# Patient Record
Sex: FEMALE | Race: WHITE | Employment: FULL TIME | URBAN - METROPOLITAN AREA
[De-identification: names, ages, dates, MRNs, and addresses within clinical notes are randomized per-mention and may not be internally consistent; named-entity substitution may affect disease eponyms.]

---

## 2017-09-07 ENCOUNTER — OFFICE VISIT (OUTPATIENT)
Dept: FAMILY MEDICINE CLINIC | Age: 47
End: 2017-09-07

## 2017-09-07 VITALS
SYSTOLIC BLOOD PRESSURE: 117 MMHG | DIASTOLIC BLOOD PRESSURE: 68 MMHG | WEIGHT: 163.4 LBS | HEART RATE: 79 BPM | TEMPERATURE: 97.6 F | OXYGEN SATURATION: 97 %

## 2017-09-07 DIAGNOSIS — Z00.00 ROUTINE GENERAL MEDICAL EXAMINATION AT A HEALTH CARE FACILITY: ICD-10-CM

## 2017-09-07 DIAGNOSIS — J30.2 SEASONAL ALLERGIC RHINITIS, UNSPECIFIED ALLERGIC RHINITIS TRIGGER: ICD-10-CM

## 2017-09-07 DIAGNOSIS — F41.8 DEPRESSION WITH ANXIETY: ICD-10-CM

## 2017-09-07 DIAGNOSIS — Z01.419 NORMAL GYNECOLOGIC EXAMINATION: Primary | ICD-10-CM

## 2017-09-07 DIAGNOSIS — N20.0 KIDNEY STONES: ICD-10-CM

## 2017-09-07 PROCEDURE — 99386 PREV VISIT NEW AGE 40-64: CPT | Performed by: FAMILY MEDICINE

## 2017-09-07 RX ORDER — BUPROPION HYDROCHLORIDE 150 MG/1
150 TABLET ORAL EVERY MORNING
Qty: 30 TABLET | Refills: 1 | Status: SHIPPED | OUTPATIENT
Start: 2017-09-07 | End: 2018-01-18 | Stop reason: CLARIF

## 2017-09-09 LAB
A/G RATIO: 1.3 (ref 1.1–2.2)
ALBUMIN SERPL-MCNC: 4 G/DL (ref 3.4–5)
ALP BLD-CCNC: 60 U/L (ref 40–129)
ALT SERPL-CCNC: 12 U/L (ref 10–40)
ANION GAP SERPL CALCULATED.3IONS-SCNC: 12 MMOL/L (ref 3–16)
AST SERPL-CCNC: 15 U/L (ref 15–37)
BILIRUB SERPL-MCNC: 0.4 MG/DL (ref 0–1)
BUN BLDV-MCNC: 19 MG/DL (ref 7–20)
CALCIUM SERPL-MCNC: 9.3 MG/DL (ref 8.3–10.6)
CHLORIDE BLD-SCNC: 103 MMOL/L (ref 99–110)
CHOLESTEROL, TOTAL: 179 MG/DL (ref 0–199)
CO2: 25 MMOL/L (ref 21–32)
CREAT SERPL-MCNC: 0.7 MG/DL (ref 0.6–1.1)
GFR AFRICAN AMERICAN: >60
GFR NON-AFRICAN AMERICAN: >60
GLOBULIN: 3.2 G/DL
GLUCOSE BLD-MCNC: 94 MG/DL (ref 70–99)
HDLC SERPL-MCNC: 84 MG/DL (ref 40–60)
LDL CHOLESTEROL CALCULATED: 84 MG/DL
POTASSIUM SERPL-SCNC: 4.8 MMOL/L (ref 3.5–5.1)
SODIUM BLD-SCNC: 140 MMOL/L (ref 136–145)
TOTAL PROTEIN: 7.2 G/DL (ref 6.4–8.2)
TRIGL SERPL-MCNC: 57 MG/DL (ref 0–150)
TSH SERPL DL<=0.05 MIU/L-ACNC: 1.2 UIU/ML (ref 0.27–4.2)
VLDLC SERPL CALC-MCNC: 11 MG/DL

## 2017-09-11 LAB
HPV COMMENT: NORMAL
HPV TYPE 16: NOT DETECTED
HPV TYPE 18: NOT DETECTED
HPVOH (OTHER TYPES): NOT DETECTED

## 2017-10-18 ENCOUNTER — OFFICE VISIT (OUTPATIENT)
Dept: FAMILY MEDICINE CLINIC | Age: 47
End: 2017-10-18

## 2017-10-18 VITALS
RESPIRATION RATE: 12 BRPM | OXYGEN SATURATION: 99 % | HEART RATE: 76 BPM | SYSTOLIC BLOOD PRESSURE: 112 MMHG | DIASTOLIC BLOOD PRESSURE: 76 MMHG | WEIGHT: 162 LBS | TEMPERATURE: 97.3 F

## 2017-10-18 DIAGNOSIS — Z23 NEED FOR INFLUENZA VACCINATION: ICD-10-CM

## 2017-10-18 DIAGNOSIS — F41.9 ANXIETY: Primary | ICD-10-CM

## 2017-10-18 DIAGNOSIS — R51.9 NONINTRACTABLE HEADACHE, UNSPECIFIED CHRONICITY PATTERN, UNSPECIFIED HEADACHE TYPE: ICD-10-CM

## 2017-10-18 PROCEDURE — 90471 IMMUNIZATION ADMIN: CPT | Performed by: FAMILY MEDICINE

## 2017-10-18 PROCEDURE — 90686 IIV4 VACC NO PRSV 0.5 ML IM: CPT | Performed by: FAMILY MEDICINE

## 2017-10-18 PROCEDURE — 99214 OFFICE O/P EST MOD 30 MIN: CPT | Performed by: FAMILY MEDICINE

## 2017-10-18 RX ORDER — M-VIT,TX,IRON,MINS/CALC/FOLIC 27MG-0.4MG
1 TABLET ORAL DAILY
COMMUNITY

## 2017-10-18 ASSESSMENT — PATIENT HEALTH QUESTIONNAIRE - PHQ9
SUM OF ALL RESPONSES TO PHQ QUESTIONS 1-9: 0
2. FEELING DOWN, DEPRESSED OR HOPELESS: 0
1. LITTLE INTEREST OR PLEASURE IN DOING THINGS: 0
SUM OF ALL RESPONSES TO PHQ9 QUESTIONS 1 & 2: 0

## 2017-10-18 NOTE — PROGRESS NOTES
Vaccine Information Sheet, \"Influenza - Inactivated\"  given to Harvey Jacobs, or parent/legal guardian of  Harvey Jacobs and verbalized understanding. Patient responses:    Have you ever had a reaction to a flu vaccine? No  Are you able to eat eggs without adverse effects? Yes  Do you have any current illness? No  Have you ever had [place]illian [place] Syndrome? No    Flu vaccine given per order. Please see immunization tab.

## 2017-10-18 NOTE — PROGRESS NOTES
Patient is here for follow up of anxiety. She was having occasional headaches . She had 2.5 drinks and developed headache and emesis. She took it for 2.5 weeks prior to running into the problem. She drinks alcohol 1-2 drinks 1-2 nights/ week. She has decreased caffeine to 1/ day - 1 coffee. She has switched to herbal tea - decaf. She is doing well with change to Zoloft 50 mg qd X 2 weeks. Slightly less energy ,but manageable. Sleeping relatively okay. Her anxiety is better overall. Less irritable. She had some increased energy with the Wellbutrin and it helped with anxiety. No sexual side effects with Zoloft . No change in libido with either Wellbutrin or Zoloft. No h/o seizures. Mood is at about 8-9/10 now and was 6/10 prior. Her 15 yo daughter was on Zoloft. No suicidal or homicidal ideation. ROS: All other systems were reviewed and are negative . Patient's allergies and medications were reviewed. Patient's past medical, surgical, social , and family history were reviewed. OBJECTIVE:  /76   Pulse 76   Temp 97.3727599064277 °F (36.3 °C) (Oral)   Resp 12   Wt 162 lb (73.5 kg)   LMP  (LMP Unknown)   SpO2 99%   Breastfeeding? No   General: NAD, cooperative, alert and oriented X 3. Mood / affect is good. good insight. well hydrated. Neck : no lymphadenopathy, supple, FROM  CV: Regular rate and rhythm , no murmurs/ rub/ gallop. No edema. Lungs : CTA bilaterally, breathing comfortably  Abdomen: positive bowel sounds, soft , non tender, non distended. No hepatosplenomegaly. No CVA tenderness. Skin: no rashes. Non tender. ASSESSMENT/  PLAN:  Sol was seen today for anxiety. Diagnoses and all orders for this visit:    Anxiety  -     Improved and continue Sertraline (ZOLOFT) 50 MG tablet; Take 1 tablet by mouth daily  Nonintractable headache, unspecified chronicity pattern, unspecified headache type        -     Stable off Wellbutrin. Monitor.    Need for influenza vaccination  -     INFLUENZA, QUADV, 3 YRS AND OLDER, IM, PF, PREFILL SYR OR SDV, 0.5ML (FLUZONE QUADV, PF)    Follow up 3-4  months/ prn.

## 2018-01-18 ENCOUNTER — OFFICE VISIT (OUTPATIENT)
Dept: FAMILY MEDICINE CLINIC | Age: 48
End: 2018-01-18

## 2018-01-18 VITALS
WEIGHT: 161.8 LBS | HEART RATE: 77 BPM | SYSTOLIC BLOOD PRESSURE: 122 MMHG | TEMPERATURE: 97.2 F | RESPIRATION RATE: 14 BRPM | BODY MASS INDEX: 22.65 KG/M2 | HEIGHT: 71 IN | DIASTOLIC BLOOD PRESSURE: 71 MMHG

## 2018-01-18 DIAGNOSIS — Z23 NEED FOR VACCINE FOR DT (DIPHTHERIA-TETANUS): ICD-10-CM

## 2018-01-18 DIAGNOSIS — F41.9 ANXIETY: Primary | ICD-10-CM

## 2018-01-18 PROCEDURE — 90714 TD VACC NO PRESV 7 YRS+ IM: CPT | Performed by: FAMILY MEDICINE

## 2018-01-18 PROCEDURE — 99214 OFFICE O/P EST MOD 30 MIN: CPT | Performed by: FAMILY MEDICINE

## 2018-01-18 PROCEDURE — 90471 IMMUNIZATION ADMIN: CPT | Performed by: FAMILY MEDICINE

## 2018-06-15 DIAGNOSIS — F41.9 ANXIETY: ICD-10-CM

## 2019-08-05 ENCOUNTER — OFFICE VISIT (OUTPATIENT)
Dept: FAMILY MEDICINE CLINIC | Age: 49
End: 2019-08-05
Payer: COMMERCIAL

## 2019-08-05 VITALS
OXYGEN SATURATION: 98 % | BODY MASS INDEX: 23.88 KG/M2 | RESPIRATION RATE: 18 BRPM | HEART RATE: 73 BPM | WEIGHT: 168.8 LBS | DIASTOLIC BLOOD PRESSURE: 82 MMHG | SYSTOLIC BLOOD PRESSURE: 122 MMHG | TEMPERATURE: 97.9 F

## 2019-08-05 DIAGNOSIS — R23.2 HOT FLASHES: ICD-10-CM

## 2019-08-05 DIAGNOSIS — F41.9 ANXIETY: ICD-10-CM

## 2019-08-05 DIAGNOSIS — M21.619 BUNION OF GREAT TOE: ICD-10-CM

## 2019-08-05 DIAGNOSIS — Z12.11 COLON CANCER SCREENING: ICD-10-CM

## 2019-08-05 DIAGNOSIS — Z00.00 ROUTINE GENERAL MEDICAL EXAMINATION AT A HEALTH CARE FACILITY: Primary | ICD-10-CM

## 2019-08-05 DIAGNOSIS — G47.9 SLEEPING DIFFICULTIES: ICD-10-CM

## 2019-08-05 PROCEDURE — 99396 PREV VISIT EST AGE 40-64: CPT | Performed by: FAMILY MEDICINE

## 2019-08-05 NOTE — PROGRESS NOTES
flashes  - TSH without Reflex; Future  - likely menopause contributing. 4. Sleeping difficulties  - Sleep hygiene recommended. 5. Bunion of great toe  - good supportive footwear recommended. 6. Colon cancer screening  - Gastroenterology referral - Wild Latham MD, Gastroenterology, [facility]    PLAN:   Follow a low fat, low cholesterol diet,  continue current healthy lifestyle patterns, including regular cardiovascular exercise >150 minutes per week,  and return for routine annual checkups  Colonoscopy screening recommended . Yearly mammogram recommended , as well as monthly self breast exam.   Calcium 1500 mg/ day , Vitamin D 800 IU/ day, and weight bearing exercise. Follow up yearly or as needed.

## 2019-10-22 ENCOUNTER — TELEPHONE (OUTPATIENT)
Dept: FAMILY MEDICINE CLINIC | Age: 49
End: 2019-10-22

## 2019-10-22 DIAGNOSIS — Z23 NEED FOR IMMUNIZATION AGAINST TYPHOID: Primary | ICD-10-CM

## 2019-10-31 ENCOUNTER — TELEPHONE (OUTPATIENT)
Dept: FAMILY MEDICINE CLINIC | Age: 49
End: 2019-10-31

## 2019-11-01 DIAGNOSIS — R23.2 HOT FLASHES: ICD-10-CM

## 2019-11-01 DIAGNOSIS — Z00.00 ROUTINE GENERAL MEDICAL EXAMINATION AT A HEALTH CARE FACILITY: ICD-10-CM

## 2019-11-01 LAB
A/G RATIO: 2.2 (ref 1.1–2.2)
ALBUMIN SERPL-MCNC: 4.8 G/DL (ref 3.4–5)
ALP BLD-CCNC: 69 U/L (ref 40–129)
ALT SERPL-CCNC: 10 U/L (ref 10–40)
ANION GAP SERPL CALCULATED.3IONS-SCNC: 13 MMOL/L (ref 3–16)
AST SERPL-CCNC: 15 U/L (ref 15–37)
BILIRUB SERPL-MCNC: 0.4 MG/DL (ref 0–1)
BUN BLDV-MCNC: 20 MG/DL (ref 7–20)
CALCIUM SERPL-MCNC: 9.3 MG/DL (ref 8.3–10.6)
CHLORIDE BLD-SCNC: 102 MMOL/L (ref 99–110)
CHOLESTEROL, TOTAL: 185 MG/DL (ref 0–199)
CO2: 25 MMOL/L (ref 21–32)
CREAT SERPL-MCNC: 0.6 MG/DL (ref 0.6–1.1)
GFR AFRICAN AMERICAN: >60
GFR NON-AFRICAN AMERICAN: >60
GLOBULIN: 2.2 G/DL
GLUCOSE BLD-MCNC: 93 MG/DL (ref 70–99)
HCT VFR BLD CALC: 41.4 % (ref 36–48)
HDLC SERPL-MCNC: 74 MG/DL (ref 40–60)
HEMOGLOBIN: 14.1 G/DL (ref 12–16)
LDL CHOLESTEROL CALCULATED: 99 MG/DL
MCH RBC QN AUTO: 33.2 PG (ref 26–34)
MCHC RBC AUTO-ENTMCNC: 34.1 G/DL (ref 31–36)
MCV RBC AUTO: 97.2 FL (ref 80–100)
PDW BLD-RTO: 12.5 % (ref 12.4–15.4)
PLATELET # BLD: 266 K/UL (ref 135–450)
PMV BLD AUTO: 9.1 FL (ref 5–10.5)
POTASSIUM SERPL-SCNC: 5.1 MMOL/L (ref 3.5–5.1)
RBC # BLD: 4.26 M/UL (ref 4–5.2)
SODIUM BLD-SCNC: 140 MMOL/L (ref 136–145)
TOTAL PROTEIN: 7 G/DL (ref 6.4–8.2)
TRIGL SERPL-MCNC: 58 MG/DL (ref 0–150)
TSH SERPL DL<=0.05 MIU/L-ACNC: 1.23 UIU/ML (ref 0.27–4.2)
VLDLC SERPL CALC-MCNC: 12 MG/DL
WBC # BLD: 4 K/UL (ref 4–11)

## 2019-11-13 ENCOUNTER — TELEPHONE (OUTPATIENT)
Dept: FAMILY MEDICINE CLINIC | Age: 49
End: 2019-11-13

## 2019-11-21 DIAGNOSIS — R92.8 ABNORMAL MAMMOGRAM OF LEFT BREAST: Primary | ICD-10-CM

## 2021-08-26 ENCOUNTER — OFFICE VISIT (OUTPATIENT)
Dept: FAMILY MEDICINE CLINIC | Age: 51
End: 2021-08-26
Payer: COMMERCIAL

## 2021-08-26 VITALS
WEIGHT: 163.2 LBS | BODY MASS INDEX: 23.37 KG/M2 | HEIGHT: 70 IN | HEART RATE: 85 BPM | RESPIRATION RATE: 16 BRPM | TEMPERATURE: 96.8 F | SYSTOLIC BLOOD PRESSURE: 112 MMHG | OXYGEN SATURATION: 98 % | DIASTOLIC BLOOD PRESSURE: 74 MMHG

## 2021-08-26 DIAGNOSIS — M76.32 ILIOTIBIAL BAND SYNDROME, LEFT LEG: ICD-10-CM

## 2021-08-26 DIAGNOSIS — G43.909 MIGRAINE WITHOUT STATUS MIGRAINOSUS, NOT INTRACTABLE, UNSPECIFIED MIGRAINE TYPE: ICD-10-CM

## 2021-08-26 DIAGNOSIS — Z23 NEED FOR SHINGLES VACCINE: ICD-10-CM

## 2021-08-26 DIAGNOSIS — M25.571 RIGHT ANKLE PAIN, UNSPECIFIED CHRONICITY: ICD-10-CM

## 2021-08-26 DIAGNOSIS — M70.62 TROCHANTERIC BURSITIS OF LEFT HIP: ICD-10-CM

## 2021-08-26 DIAGNOSIS — J30.2 SEASONAL ALLERGIC RHINITIS, UNSPECIFIED TRIGGER: ICD-10-CM

## 2021-08-26 DIAGNOSIS — Z23 NEEDS FLU SHOT: ICD-10-CM

## 2021-08-26 DIAGNOSIS — Z00.00 ROUTINE GENERAL MEDICAL EXAMINATION AT A HEALTH CARE FACILITY: Primary | ICD-10-CM

## 2021-08-26 DIAGNOSIS — Z01.419 GYNECOLOGIC EXAM NORMAL: ICD-10-CM

## 2021-08-26 PROCEDURE — 90686 IIV4 VACC NO PRSV 0.5 ML IM: CPT | Performed by: FAMILY MEDICINE

## 2021-08-26 PROCEDURE — 90472 IMMUNIZATION ADMIN EACH ADD: CPT | Performed by: FAMILY MEDICINE

## 2021-08-26 PROCEDURE — 99213 OFFICE O/P EST LOW 20 MIN: CPT | Performed by: FAMILY MEDICINE

## 2021-08-26 PROCEDURE — 90471 IMMUNIZATION ADMIN: CPT | Performed by: FAMILY MEDICINE

## 2021-08-26 PROCEDURE — 99396 PREV VISIT EST AGE 40-64: CPT | Performed by: FAMILY MEDICINE

## 2021-08-26 PROCEDURE — 90750 HZV VACC RECOMBINANT IM: CPT | Performed by: FAMILY MEDICINE

## 2021-08-26 RX ORDER — SUMATRIPTAN 100 MG/1
100 TABLET, FILM COATED ORAL
Qty: 9 TABLET | Refills: 1 | Status: SHIPPED | OUTPATIENT
Start: 2021-08-26 | End: 2021-08-26

## 2021-08-26 SDOH — ECONOMIC STABILITY: FOOD INSECURITY: WITHIN THE PAST 12 MONTHS, THE FOOD YOU BOUGHT JUST DIDN'T LAST AND YOU DIDN'T HAVE MONEY TO GET MORE.: NEVER TRUE

## 2021-08-26 SDOH — ECONOMIC STABILITY: FOOD INSECURITY: WITHIN THE PAST 12 MONTHS, YOU WORRIED THAT YOUR FOOD WOULD RUN OUT BEFORE YOU GOT MONEY TO BUY MORE.: NEVER TRUE

## 2021-08-26 ASSESSMENT — PATIENT HEALTH QUESTIONNAIRE - PHQ9
2. FEELING DOWN, DEPRESSED OR HOPELESS: 0
SUM OF ALL RESPONSES TO PHQ QUESTIONS 1-9: 0
SUM OF ALL RESPONSES TO PHQ9 QUESTIONS 1 & 2: 0
1. LITTLE INTEREST OR PLEASURE IN DOING THINGS: 0

## 2021-08-26 ASSESSMENT — SOCIAL DETERMINANTS OF HEALTH (SDOH): HOW HARD IS IT FOR YOU TO PAY FOR THE VERY BASICS LIKE FOOD, HOUSING, MEDICAL CARE, AND HEATING?: NOT HARD AT ALL

## 2021-08-26 NOTE — PROGRESS NOTES
Patient is a 44y.o. year old female presenting for a complete physical today. Last PAP:9/17-nl/ HPV neg; 2013 ? History of Abnormal PAP: ASCUS only ? - 2005 or earlier? Prior mammogram:2/21; 11/19; 11/18; 1/17   Last colonoscopy: 11/19- Dr. Lang Mcleod - repeat in 11/24   Last DEXA: never  Last eye exam: scheduled 8/31/21  Current smoker: no   Self breast exam: yes   Exercise: 4d/ week - jazzercise/ aerobics  Caffeine: 1-2 d/ week  Alcohol: 4/ week   LMP: 1/19. She gets headache 0-4/ month. No photophobia. Excedrin migraine usually helps. No vomiting, diarrhea . No visual aura. Rarely has nausea. Left sided usually. Unsure what triggers her headache. She has had 2 headaches in the past 2 weeks. Last headache was 8/22/21 and 8/25/21. Slight headache today. She has menopause symptoms of hot flashes, decreased libido. She has some intermittent right ankle pain - sharp at times. 1 q 2-3 weeks. Wears shoes for [employer]. Ankle brace helped. Started 6 months ago. Left hip pain off and on . Lying on left side flares it. Started X 3-6 months. Occurs 4 times per week, mostly with pressure at [place] 4 d/ week. Left leg cramps - lateral leg - 1x/ week. Some snoring - positional only currently. Bunions to great toes. No pain . Patient's allergies and medications were reviewed. Patient's past medical, surgical, social , and family history were reviewed.      Past Medical History:   Diagnosis Date    Allergic rhinitis     Kidney stones     Kidney stones     Visual impairment     wear contacts        Review of patient's past surgical history indicates:     Past Surgical History:   Procedure Laterality Date    APPENDECTOMY  34    INTRAUTERINE DEVICE INSERTION  2009    removed in 2013                                                   Current Outpatient Medications   Medication Sig Dispense Refill    NONFORMULARY AMBEREN      Multiple Vitamins-Minerals (THERAPEUTIC MULTIVITAMIN-MINERALS) tablet Take 1 tablet by mouth daily      Fluticasone Propionate (FLONASE NA) by Nasal route       No current facility-administered medications for this visit. Allergies   Allergen Reactions    Seasonal Other (See Comments)     Runny nose       Social History     Tobacco Use    Smoking status: Never Smoker    Smokeless tobacco: Never Used   Substance Use Topics    Alcohol use: Yes    Drug use: Not on file        Family History   Problem Relation Age of Onset    Diabetes Mother     Heart Disease Father     Heart Attack Father 79        ROS:  Feeling well. No dyspnea or chest pain on exertion. No abdominal pain, change in bowel habits, black or bloody stools. No urinary tract symptoms. No neurological complaints. See patient physical/  ROS questionnaire. OBJECTIVE:   /74   Pulse 85   Temp 96.8 °F (36 °C)   Resp 16   Ht 5' 10\" (1.778 m)   Wt 163 lb 3.2 oz (74 kg)   LMP  (LMP Unknown)   SpO2 98%   BMI 23.42 kg/m²   There were no vitals filed for this visit. The patient appears well, alert, oriented x 3, in no distress. HEENT : normocephalic, atraumatic, PERRLA, EOMI, tympanic membranes and nasopharynx are normal.  Neck supple. No adenopathy or thyromegaly. Upper extremities : DTRs 2+ biceps/ triceps/ brachioradialis bilateral.  FROM. Strength 5/5. Lungs are clear, good air entry, no wheezes, rhonchi or rales. Breathing comfortably. Cardiovascular: regular rate and rhythm. S1 and S2 are normal, no murmurs,rubs, and gallops. No edema. Abdomen is soft without tenderness, guarding, mass or organomegaly. Normal bowel sounds. Back: non tender. FROM. negative straight leg-raise. Breasts are symmetric. No dominant, discrete, fixed  or suspicious masses are noted. No skin or nipple changes or axillary nodes. : Vagina and vulva are normal;  no discharge is noted. Cervix normal without lesions.  Uterus anteverted and mobile, normal in size and shape without tenderness. Adnexa normal in size without masses or tenderness. Pap Smear - is completed today. Lower extremities : DTRs 2+ knees and ankles bilateral.  FROM. Strength 5/5. Negative straight leg-raise. No edema or erythema bilateral.  normal peripheral pulses. Right ankle: minimal tenderness to anterior/lateral ankle. No edema or erythema. Left LUE: tenderness to lateral hip - inferior to greater trochanter and ITB. No edema or erythema. Strength 5/5. Neuro: Cranial nerves 2-12 are normal. Deep tendon reflexes are 2+ and equal to all extremities. No focal sensory, or motor deficit noted. Skin: no rashes or suspicious lesions. ASSESSMENT:   1. Routine general medical examination at a health care facility  - Patient had Biometrics at work. 2. Gynecologic exam normal  - PAP SMEAR  - Human papillomavirus (HPV) DNA probe thin prep high risk    3. Seasonal allergic rhinitis, unspecified trigger  - Stable. 4. Migraine without status migrainosus, not intractable, unspecified migraine type  - Discussed triggers. Excedrin migraine prn.  - Trial of Imitrex prn if no relief with Excedrin pm   -  SUMAtriptan (IMITREX) 100 MG tablet; Take 1 tablet by mouth once as needed for Migraine  Dispense: 9 tablet; Refill: 1    5. Right ankle pain, unspecified chronicity  - likely musculoskeletal ankle sprain.  - Moist heat . ROM exercises. Modify activities. - Aleve 1-2 po bid prn.  - Consider more supportive footwear. 6. Trochanteric bursitis of left hip  - Moist heat . ROM exercises- handout given. Modify activities. - Aleve 1-2 po bid prn.     7. Iliotibial band syndrome, left leg  - Moist heat . ROM exercises- handout given. Modify activities. - Aleve 1-2 po bid prn.    8. Need for shingles vaccine  - Zoster recombinant [facility]) #1 . Give #2 in 2-6 months.     9. Needs flu shot  - INFLUENZA, QUADV, 0.5ML, 6 MO AND OLDER, IM, PF, PREFILL SYR OR SDV (FLUZONE QUADV, PF)    PLAN:   Follow a low fat, low cholesterol diet,  continue current healthy lifestyle patterns, including regular cardiovascular exercise >150 minutes per week,  and return for routine annual checkups  Repeat colonoscopy screening recommended in 11/24. Yearly mammogram recommended , as well as monthly self breast exam.   Calcium 1500 mg/ day , Vitamin D 800 IU/ day, and weight bearing exercise. Follow up if no improvement in  4-6 weeks/ as needed for increased symptoms.

## 2021-08-26 NOTE — PATIENT INSTRUCTIONS
Patient Education        Hip Bursitis: Exercises  Introduction  Here are some examples of exercises for you to try. The exercises may be suggested for a condition or for rehabilitation. Start each exercise slowly. Ease off the exercises if you start to have pain. You will be told when to start these exercises and which ones will work best for you. How to do the exercises  Hip rotator stretch   1. Lie on your back with both knees bent and your feet flat on the floor. 2. Put the ankle of your affected leg on your opposite thigh near your knee. 3. Use your hand to gently push your knee away from your body until you feel a gentle stretch around your hip. 4. Hold the stretch for 15 to 30 seconds. 5. Repeat 2 to 4 times. 6. Repeat steps 1 through 5, but this time use your hand to gently pull your knee toward your opposite shoulder. Iliotibial band stretch   1. Lean sideways against a wall. If you are not steady on your feet, hold on to a chair or counter. 2. Stand on the leg with the affected hip, with that leg close to the wall. Then cross your other leg in front of it. 3. Let your affected hip drop out to the side of your body and against wall. Then lean away from your affected hip until you feel a stretch. 4. Hold the stretch for 15 to 30 seconds. 5. Repeat 2 to 4 times. Straight-leg raises to the outside   1. Lie on your side, with your affected hip on top. 2. Tighten the front thigh muscles of your top leg to keep your knee straight. 3. Keep your hip and your leg straight in line with the rest of your body, and keep your knee pointing forward. Do not drop your hip back. 4. Lift your top leg straight up toward the ceiling, about 12 inches off the floor. Hold for about 6 seconds, then slowly lower your leg. 5. Repeat 8 to 12 times. Clamshell   1. Lie on your side, with your affected hip on top and your head propped on a pillow. Keep your feet and knees together and your knees bent.   2. Raise your top knee, but keep your feet together. Do not let your hips roll back. Your legs should open up like a clamshell. 3. Hold for 6 seconds. 4. Slowly lower your knee back down. Rest for 10 seconds. 5. Repeat 8 to 12 times. Follow-up care is a key part of your treatment and safety. Be sure to make and go to all appointments, and call your doctor if you are having problems. It's also a good idea to know your test results and keep a list of the medicines you take. Where can you learn more? Go to https://ClearDATApeDwellAware.[employer]. org and sign in to your [employer] account. Enter L405 in the [facility] box to learn more about \"Hip Bursitis: Exercises. \"     If you do not have an account, please click on the \"Sign Up Now\" link. Current as of: November 16, 2020               Content Version: 12.9  © 2006-2021 [employer]. Care instructions adapted under license by [facility] ([facility]). If you have questions about a medical condition or this instruction, always ask your healthcare professional. [place] [address] any warranty or liability for your use of this information. Patient Education        Iliotibial Band Syndrome: Exercises  Introduction  Here are some examples of exercises for you to try. The exercises may be suggested for a condition or for rehabilitation. Start each exercise slowly. Ease off the exercises if you start to have pain. You will be told when to start these exercises and which ones will work best for you. How to do the exercises  Iliotibial band stretch   1. Lean sideways against a wall. If you are not steady on your feet, hold on to a chair or counter. 2. Stand on the leg with the affected hip, with that leg close to the wall. Then cross your other leg in front of it. 3. Let your affected hip drop out to the side of your body and against the wall. Then lean away from your affected hip until you feel a stretch.   4. Hold the stretch for 15 to 30 seconds, and relax. Repeat 8 to 12 times. 2. After you feel comfortable with this, try using rubber tubing looped around the outside of your feet for resistance. Push your foot out to the side against the tubing, and then count to 10 as you slowly bring your foot back to the middle. Repeat 8 to 12 times. Isometric opposition exercises   1. While sitting, put your feet together flat on the floor. 2. Press your injured foot inward against your other foot. Hold for about 6 seconds, and relax. Repeat 8 to 12 times. 3. Then place the heel of your other foot on top of the injured one. Push down with the top heel while trying to push up with your injured foot. Hold for about 6 seconds, and relax. Repeat 8 to 12 times. Resisted ankle inversion   1. Sit on the floor with your good leg crossed over your other leg. 2. Hold both ends of an exercise band and loop the band around the inside of your affected foot. Then press your other foot against the band. 3. Keeping your legs crossed, slowly push your affected foot against the band so that foot moves away from your other foot. Then slowly relax. 4. Repeat 8 to 12 times. Resisted ankle eversion   1. Sit on the floor with your legs straight. 2. Hold both ends of an exercise band and loop the band around the outside of your affected foot. Then press your other foot against the band. 3. Keeping your leg straight, slowly push your affected foot outward against the band and away from your other foot without letting your leg rotate. Then slowly relax. 4. Repeat 8 to 12 times. Resisted ankle dorsiflexion   1. Tie the ends of an exercise band together to form a loop. Attach one end of the loop to a secure object or shut a door on it to hold it in place. (Or you can have someone hold one end of the loop to provide resistance.)  2. While sitting on the floor or in a chair, loop the other end of the band over the top of your affected foot.   3. Keeping your knee and leg straight, slowly flex your foot to pull back on the exercise band, and then slowly relax. 4. Repeat 8 to 12 times. Single-leg balance   1. Stand on a flat surface with your arms stretched out to your sides like you are making the letter \"T. \" Then lift your good leg off the floor, bending it at the knee. If you are not steady on your feet, use one hand to hold on to a chair, counter, or wall. 2. Standing on the leg with your affected ankle, keep that knee straight. Try to balance on that leg for up to 30 seconds. Then rest for up to 10 seconds. 3. Repeat 6 to 8 times. 4. When you can balance on your affected leg for 30 seconds with your eyes open, try to balance on it with your eyes closed. 5. When you can do this exercise with your eyes closed for 30 seconds and with ease and no pain, try standing on a pillow or piece of foam, and repeat steps 1 through 4. Follow-up care is a key part of your treatment and safety. Be sure to make and go to all appointments, and call your doctor if you are having problems. It's also a good idea to know your test results and keep a list of the medicines you take. Where can you learn more? Go to https://WhoJampepiceweb.Pocket. org and sign in to your [employer] account. Enter Yves Lal in the [facility] box to learn more about \"Ankle Sprain: Rehab Exercises. \"     If you do not have an account, please click on the \"Sign Up Now\" link. Current as of: November 16, 2020               Content Version: 12.9  © 2006-2021 [employer]. Care instructions adapted under license by [facility] ([facility]). If you have questions about a medical condition or this instruction, always ask your healthcare professional. [address] any warranty or liability for your use of this information.

## 2021-09-07 ENCOUNTER — OFFICE VISIT (OUTPATIENT)
Dept: FAMILY MEDICINE CLINIC | Age: 51
End: 2021-09-07
Payer: COMMERCIAL

## 2021-09-07 VITALS
DIASTOLIC BLOOD PRESSURE: 82 MMHG | RESPIRATION RATE: 20 BRPM | HEART RATE: 70 BPM | WEIGHT: 168.6 LBS | TEMPERATURE: 97.3 F | SYSTOLIC BLOOD PRESSURE: 130 MMHG | BODY MASS INDEX: 24.19 KG/M2 | OXYGEN SATURATION: 99 %

## 2021-09-07 DIAGNOSIS — R35.0 URINARY FREQUENCY: Primary | ICD-10-CM

## 2021-09-07 LAB
BILIRUBIN, POC: NEGATIVE
BLOOD URINE, POC: NORMAL
CLARITY, POC: CLEAR
COLOR, POC: CLEAR
GLUCOSE URINE, POC: NEGATIVE
KETONES, POC: NEGATIVE
LEUKOCYTE EST, POC: NEGATIVE
NITRITE, POC: NEGATIVE
PH, POC: 7
PROTEIN, POC: NEGATIVE
SPECIFIC GRAVITY, POC: 1
UROBILINOGEN, POC: 0.2

## 2021-09-07 PROCEDURE — 81002 URINALYSIS NONAUTO W/O SCOPE: CPT | Performed by: FAMILY MEDICINE

## 2021-09-07 PROCEDURE — 99213 OFFICE O/P EST LOW 20 MIN: CPT | Performed by: FAMILY MEDICINE

## 2021-09-07 NOTE — PROGRESS NOTES
Patient is here for urinary frequency , which started on Thursday on way in car with camping. She had left over Cipro and started on Saturday - Monday - 7 doses , but not helping much. No dysuria. She denies abnormal vaginal bleeding, itching, discharge or unusual pelvic pain, no dysuria or hematuria. No menses X almost 3 years with menopause. No dysparenia . Last sexually active over the weekend. Caffeine 2 this am and is norm. 1-2 in the afternoon. Same with camping this weekend. No fever. She had some low back pain on 8/28/21 to left flank. Took Ibuprofen and drank a lot of water. Prior kidney stone was 20 years ago. She did have alcohol this weekend - 3 per day - Thursday- Saturday. She also took Methocarbamol and seemed to help a bit for awhile with frequency. No abdominal or back pain now. Review of Systems    ROS: All other systems were reviewed and are negative . Patient's allergies and medications were reviewed. Patient's past medical, surgical, social , and family history were reviewed. No results found for this visit on 09/07/21. OBJECTIVE:  /82   Pulse 70   Temp 97.3 °F (36.3 °C)   Resp 20   Wt 168 lb 9.6 oz (76.5 kg)   LMP  (LMP Unknown)   SpO2 99%   BMI 24.19 kg/m²     Physical Exam    General: NAD, cooperative, alert and oriented X 3. Mood / affect is good. good insight. well hydrated. Neck : no lymphadenopathy, supple, FROM  CV: Regular rate and rhythm , no murmurs/ rub/ gallop. No edema. Lungs : CTA bilaterally, breathing comfortably  Abdomen: positive bowel sounds, soft , non tender, non distended. No hepatosplenomegaly. No CVA tenderness. Skin: no rashes. Non tender. ASSESSMENT/  PLAN:  1. Urinary frequency  - Continue Cipro for 3 additional doses. - Taper caffeine and avoid alcohol and monitor.    - Likely urine culture will be negative given Cipro taken (7 doses to date).   - POCT Urinalysis no Micro  - Culture, Urine  - Microscopic Urinalysis       F/u if no improvement 3d/ prn increased symptoms or recurrences.

## 2021-09-08 LAB
EPITHELIAL CELLS, UA: 0 /HPF (ref 0–5)
HYALINE CASTS: 0 /LPF (ref 0–8)
RBC UA: 0 /HPF (ref 0–4)
URINE CULTURE, ROUTINE: NORMAL
URINE TYPE: NORMAL
WBC UA: 0 /HPF (ref 0–5)

## 2021-09-09 ENCOUNTER — TELEPHONE (OUTPATIENT)
Dept: FAMILY MEDICINE CLINIC | Age: 51
End: 2021-09-09

## 2021-09-09 NOTE — TELEPHONE ENCOUNTER
Patient wants to know if she needs to be seen again or can you call her in another antibiotic, patient states symptoms are the same no improvement; still urgency to urinate. Patient would like clinical to contact her. Please advise.

## 2021-09-09 NOTE — TELEPHONE ENCOUNTER
Urine culture was negative, so no further antibiotics are recommended. If her symptoms persist despite avoiding caffeine and alcohol, an appointment is preferred . Vaginal symptoms may be contributing, including dryness, in which case a prescription for estrogen vaginal cream may be helpful. I would like the patient to follow up to discuss further.

## 2021-09-13 ENCOUNTER — OFFICE VISIT (OUTPATIENT)
Dept: FAMILY MEDICINE CLINIC | Age: 51
End: 2021-09-13
Payer: COMMERCIAL

## 2021-09-13 VITALS
BODY MASS INDEX: 23.7 KG/M2 | TEMPERATURE: 97.1 F | WEIGHT: 165.2 LBS | DIASTOLIC BLOOD PRESSURE: 78 MMHG | RESPIRATION RATE: 17 BRPM | SYSTOLIC BLOOD PRESSURE: 110 MMHG | HEART RATE: 90 BPM | OXYGEN SATURATION: 99 %

## 2021-09-13 DIAGNOSIS — R35.0 URINARY FREQUENCY: Primary | ICD-10-CM

## 2021-09-13 DIAGNOSIS — N89.8 VAGINAL DRYNESS: ICD-10-CM

## 2021-09-13 LAB
BILIRUBIN, POC: NEGATIVE
BLOOD URINE, POC: NORMAL
CLARITY, POC: CLEAR
COLOR, POC: YELLOW
GLUCOSE URINE, POC: NEGATIVE
KETONES, POC: NEGATIVE
LEUKOCYTE EST, POC: NEGATIVE
NITRITE, POC: NEGATIVE
PH, POC: 6
PROTEIN, POC: NEGATIVE
SPECIFIC GRAVITY, POC: 1.03
UROBILINOGEN, POC: 0.2

## 2021-09-13 PROCEDURE — 81002 URINALYSIS NONAUTO W/O SCOPE: CPT | Performed by: FAMILY MEDICINE

## 2021-09-13 PROCEDURE — 99214 OFFICE O/P EST MOD 30 MIN: CPT | Performed by: FAMILY MEDICINE

## 2021-09-13 RX ORDER — ESTRADIOL 0.1 MG/G
1 CREAM VAGINAL NIGHTLY
Qty: 42.5 G | Refills: 1 | Status: SHIPPED | OUTPATIENT
Start: 2021-09-13 | End: 2022-09-23

## 2021-09-13 NOTE — PROGRESS NOTES
Patient is here for continue urinary frequency . . she finished Cipro on 9/9/21 in the am.  1 coffee caffeine in the am.  Rarely alcohol ,except 1 on Saturday night. 64 oz of water per day. Still with burning and some hesitancy. She is fine first thing in the am usually. She did fine last night. If she awoke Saturday it was just once. She has more symptoms more when she lies down at night and during the day . She drank 16 oz on her walk this am. Last intercourse was on Sunday and used lubrication - small amount. LMP= 1/18. No incontinence , except stress incontinence. She started with symptoms on 9/2/21. No problems prior to PAP in 8/26/21. Review of Systems    ROS: All other systems were reviewed and are negative . Patient's allergies and medications were reviewed. Patient's past medical, surgical, social , and family history were reviewed. Results for POC orders placed in visit on 09/13/21   POCT Urinalysis no Micro   Result Value Ref Range    Color, UA YELLOW     Clarity, UA CLEAR     Glucose, UA POC NEGATIVE     Bilirubin, UA NEGATIVE     Ketones, UA NEGATIVE     Spec Grav, UA 1.030     Blood, UA POC TRACE     pH, UA 6.0     Protein, UA POC NEGATIVE     Urobilinogen, UA 0.2     Leukocytes, UA NEGATIVE     Nitrite, UA NEGATIVE       OBJECTIVE:  /78   Pulse 90   Temp 97.1 °F (36.2 °C)   Resp 17   Wt 165 lb 3.2 oz (74.9 kg)   LMP  (LMP Unknown)   SpO2 99%   BMI 23.70 kg/m²     Physical Exam    General: NAD, cooperative, alert and oriented X 3. Mood / affect is good. good insight. well hydrated. Neck : no lymphadenopathy, supple, FROM  CV: Regular rate and rhythm , no murmurs/ rub/ gallop. No edema. Lungs : CTA bilaterally, breathing comfortably  Abdomen: positive bowel sounds, soft , non tender, non distended. No hepatosplenomegaly. No CVA tenderness. Skin: no rashes. Non tender. ASSESSMENT/  PLAN:  1. Urinary frequency  - Push fluids - water.  Avoid caffeine and alcohol and monitor.  - POCT Urinalysis no Micro  - Culture, Urine  - Further treatment pending results. 2. Vaginal dryness  - if negative urine culture, start Estradiol (ESTRACE VAGINAL) 0.1 MG/GM vaginal cream; Place 1 g vaginally nightly X 7 days then 1-3 nights per week. Dispense: 42.5 g; Refill: 1  - if no improvement Uro/GYN referral discussed. Likely vaginal dryness is contributing to urinary symptoms. Follow up if no improvement in 3- 4 weeks/ as needed for increased symptoms.

## 2021-09-15 LAB — URINE CULTURE, ROUTINE: NORMAL

## 2022-09-23 ENCOUNTER — OFFICE VISIT (OUTPATIENT)
Dept: FAMILY MEDICINE CLINIC | Age: 52
End: 2022-09-23
Payer: COMMERCIAL

## 2022-09-23 VITALS
WEIGHT: 167.2 LBS | HEIGHT: 70 IN | RESPIRATION RATE: 12 BRPM | DIASTOLIC BLOOD PRESSURE: 88 MMHG | BODY MASS INDEX: 23.94 KG/M2 | SYSTOLIC BLOOD PRESSURE: 130 MMHG | HEART RATE: 88 BPM | TEMPERATURE: 97.6 F | OXYGEN SATURATION: 98 %

## 2022-09-23 DIAGNOSIS — R23.2 HOT FLASHES: ICD-10-CM

## 2022-09-23 DIAGNOSIS — R06.83 SNORING: ICD-10-CM

## 2022-09-23 DIAGNOSIS — J30.9 ALLERGIC RHINITIS, UNSPECIFIED SEASONALITY, UNSPECIFIED TRIGGER: ICD-10-CM

## 2022-09-23 DIAGNOSIS — Z23 FLU VACCINE NEED: ICD-10-CM

## 2022-09-23 DIAGNOSIS — R22.41 LUMP OF RIGHT THIGH: ICD-10-CM

## 2022-09-23 DIAGNOSIS — Z00.00 ROUTINE GENERAL MEDICAL EXAMINATION AT A HEALTH CARE FACILITY: Primary | ICD-10-CM

## 2022-09-23 DIAGNOSIS — Z11.59 NEED FOR HEPATITIS C SCREENING TEST: ICD-10-CM

## 2022-09-23 PROCEDURE — 90674 CCIIV4 VAC NO PRSV 0.5 ML IM: CPT | Performed by: FAMILY MEDICINE

## 2022-09-23 PROCEDURE — 90471 IMMUNIZATION ADMIN: CPT | Performed by: FAMILY MEDICINE

## 2022-09-23 PROCEDURE — 99396 PREV VISIT EST AGE 40-64: CPT | Performed by: FAMILY MEDICINE

## 2022-09-23 SDOH — ECONOMIC STABILITY: FOOD INSECURITY: WITHIN THE PAST 12 MONTHS, YOU WORRIED THAT YOUR FOOD WOULD RUN OUT BEFORE YOU GOT MONEY TO BUY MORE.: NEVER TRUE

## 2022-09-23 SDOH — ECONOMIC STABILITY: FOOD INSECURITY: WITHIN THE PAST 12 MONTHS, THE FOOD YOU BOUGHT JUST DIDN'T LAST AND YOU DIDN'T HAVE MONEY TO GET MORE.: NEVER TRUE

## 2022-09-23 ASSESSMENT — PATIENT HEALTH QUESTIONNAIRE - PHQ9
2. FEELING DOWN, DEPRESSED OR HOPELESS: 0
SUM OF ALL RESPONSES TO PHQ QUESTIONS 1-9: 0
SUM OF ALL RESPONSES TO PHQ QUESTIONS 1-9: 0
SUM OF ALL RESPONSES TO PHQ9 QUESTIONS 1 & 2: 0
SUM OF ALL RESPONSES TO PHQ QUESTIONS 1-9: 0
SUM OF ALL RESPONSES TO PHQ QUESTIONS 1-9: 0
1. LITTLE INTEREST OR PLEASURE IN DOING THINGS: 0

## 2022-09-23 ASSESSMENT — SOCIAL DETERMINANTS OF HEALTH (SDOH): HOW HARD IS IT FOR YOU TO PAY FOR THE VERY BASICS LIKE FOOD, HOUSING, MEDICAL CARE, AND HEATING?: NOT HARD AT ALL

## 2022-09-23 NOTE — PROGRESS NOTES
Patient is a 46y.o. year old female presenting for a complete physical today. Last PAP:8/21; 9/17-nl/ HPV neg; 2013 ? History of Abnormal PAP: ASCUS only ? - 2005 or earlier? Prior mammogram:3/22; 2/21; 11/19; 11/18; 1/17   Last colonoscopy: 11/19- Dr. Emanuel Duran - repeat in 11/24   Last DEXA: never  Last eye exam: 9/22  Current smoker: no   Self breast exam: yes   Exercise: 4d/ week - jazzercise/ aerobics ; walking  Caffeine: 1/ week   Alcohol: 4/ week   LMP: 1/19    Snores per her . Energy is fairly good - walks at 5:30 am and goes to bed at 10 pm.  Some post nasal drip . Uses Flonase NS intermittently . Occasional watery , scratchy eyes. Her daughter's cat is with them , until daughter ,24 yo.  , graduates. Thinks she has an allergy to cats, but never verified. She notices she does better when she spends night away from home or on vacation. 2 lumps to anterior thigh X 6 months. No pain or tenderness. No significant change in size. Does not recall how she notices, likely washing or shaving . She will be going to [facility] with 25 people - couples and singles ( 1/3) - most are with [facility]. Patient's allergies and medications were reviewed. Patient's past medical, surgical, social , and family history were reviewed.      Past Medical History:   Diagnosis Date    Allergic rhinitis     Kidney stones     Kidney stones     Visual impairment     wear contacts        Review of patient's past surgical history indicates:     Past Surgical History:   Procedure Laterality Date    [address]  2009    removed in 2013                                                   Current Outpatient Medications   Medication Sig Dispense Refill    NONFORMULARY AMBEREN      Multiple Vitamins-Minerals (THERAPEUTIC MULTIVITAMIN-MINERALS) tablet Take 1 tablet by mouth daily      Fluticasone Propionate (FLONASE NA) by Nasal route      SUMAtriptan (IMITREX) 100 MG tablet

## 2022-10-12 DIAGNOSIS — R23.2 HOT FLASHES: ICD-10-CM

## 2022-10-12 DIAGNOSIS — R06.83 SNORING: ICD-10-CM

## 2022-10-12 DIAGNOSIS — J30.9 ALLERGIC RHINITIS, UNSPECIFIED SEASONALITY, UNSPECIFIED TRIGGER: ICD-10-CM

## 2022-10-12 DIAGNOSIS — Z00.00 ROUTINE GENERAL MEDICAL EXAMINATION AT A HEALTH CARE FACILITY: ICD-10-CM

## 2022-10-12 DIAGNOSIS — Z11.59 NEED FOR HEPATITIS C SCREENING TEST: ICD-10-CM

## 2022-10-12 LAB
A/G RATIO: 1.7 (ref 1.1–2.2)
ALBUMIN SERPL-MCNC: 4.7 G/DL (ref 3.4–5)
ALP BLD-CCNC: 76 U/L (ref 40–129)
ALT SERPL-CCNC: 20 U/L (ref 10–40)
ANION GAP SERPL CALCULATED.3IONS-SCNC: 11 MMOL/L (ref 3–16)
AST SERPL-CCNC: 20 U/L (ref 15–37)
BILIRUB SERPL-MCNC: 0.6 MG/DL (ref 0–1)
BUN BLDV-MCNC: 16 MG/DL (ref 7–20)
CALCIUM SERPL-MCNC: 9.4 MG/DL (ref 8.3–10.6)
CHLORIDE BLD-SCNC: 101 MMOL/L (ref 99–110)
CHOLESTEROL, TOTAL: 224 MG/DL (ref 0–199)
CO2: 27 MMOL/L (ref 21–32)
CREAT SERPL-MCNC: 0.6 MG/DL (ref 0.6–1.1)
GFR AFRICAN AMERICAN: >60
GFR NON-AFRICAN AMERICAN: >60
GLUCOSE BLD-MCNC: 96 MG/DL (ref 70–99)
HCT VFR BLD CALC: 41.5 % (ref 36–48)
HDLC SERPL-MCNC: 78 MG/DL (ref 40–60)
HEMOGLOBIN: 13.9 G/DL (ref 12–16)
HEPATITIS C ANTIBODY INTERPRETATION: NORMAL
LDL CHOLESTEROL CALCULATED: 131 MG/DL
MCH RBC QN AUTO: 32.3 PG (ref 26–34)
MCHC RBC AUTO-ENTMCNC: 33.4 G/DL (ref 31–36)
MCV RBC AUTO: 96.8 FL (ref 80–100)
PDW BLD-RTO: 12.6 % (ref 12.4–15.4)
PLATELET # BLD: 297 K/UL (ref 135–450)
PMV BLD AUTO: 8.6 FL (ref 5–10.5)
POTASSIUM SERPL-SCNC: 5 MMOL/L (ref 3.5–5.1)
RBC # BLD: 4.29 M/UL (ref 4–5.2)
SODIUM BLD-SCNC: 139 MMOL/L (ref 136–145)
TOTAL PROTEIN: 7.4 G/DL (ref 6.4–8.2)
TRIGL SERPL-MCNC: 76 MG/DL (ref 0–150)
TSH SERPL DL<=0.05 MIU/L-ACNC: 1.35 UIU/ML (ref 0.27–4.2)
VLDLC SERPL CALC-MCNC: 15 MG/DL
WBC # BLD: 4.1 K/UL (ref 4–11)

## 2022-10-16 LAB
2000687N OAK TREE IGE: 0.69 KU/L (ref 0–0.34)
ALLERGEN BERMUDA GRASS IGE: <0.1 KU/L (ref 0–0.34)
ALLERGEN BIRCH IGE: 0.75 KU/L (ref 0–0.34)
ALLERGEN DOG DANDER IGE: <0.1 KU/L (ref 0–0.34)
ALLERGEN GERMAN COCKROACH IGE: <0.1 KU/L (ref 0–0.34)
ALLERGEN HORMODENDRUM IGE: <0.1 KUL/L (ref 0–0.34)
ALLERGEN MOUSE EPITHELIA IGE: <0.1 KU/L (ref 0–0.34)
ALLERGEN PECAN TREE IGE: 0.63 KU/L (ref 0–0.34)
ALLERGEN PIGWEED ROUGH IGE: <0.1 KU/L (ref 0–0.34)
ALLERGEN SHEEP SORREL (W18) IGE: <0.1 KU/L (ref 0–0.34)
ALLERGEN TREE SYCAMORE: <0.1 KU/L (ref 0–0.34)
ALLERGEN WALNUT TREE IGE: 0.56 KU/L (ref 0–0.34)
ALLERGEN WHITE MULBERRY TREE, IGE: <0.1 KU/L (ref 0–0.34)
ALLERGEN, TREE, WHITE ASH IGE: 0.13 KU/L (ref 0–0.34)
ALTERNARIA ALTERNATA: <0.1 KU/L (ref 0–0.34)
ASPERGILLUS FUMIGATUS: <0.1 KU/L (ref 0–0.34)
CAT DANDER ANTIBODY: 0.13 KU/L (ref 0–0.34)
COTTONWOOD TREE: <0.1 KU/L (ref 0–0.34)
D. FARINAE: 0.25 KU/L (ref 0–0.34)
D. PTERONYSSINUS: 0.2 KU/L (ref 0–0.34)
ELM TREE: <0.1 KU/L (ref 0–0.34)
IGE: 36 IU/ML
MAPLE/BOXELDER TREE: 0.48 KU/L (ref 0–0.34)
MOUNTAIN CEDAR TREE: 0.47 KU/L (ref 0–0.34)
MUCOR RACEMOSUS: <0.1 KU/L (ref 0–0.34)
P. NOTATUM: <0.1 KU/L (ref 0–0.34)
RUSSIAN THISTLE: <0.1 KU/L (ref 0–0.34)
SHORT RAGWD(A ARTEMIS.) IGE: 1.57 KU/L (ref 0–0.34)
TIMOTHY GRASS: 0.49 KU/L (ref 0–0.34)

## 2022-10-23 PROBLEM — T78.40XA ALLERGIES: Status: ACTIVE | Noted: 2022-10-01

## 2023-01-06 ENCOUNTER — OFFICE VISIT (OUTPATIENT)
Dept: FAMILY MEDICINE CLINIC | Age: 53
End: 2023-01-06
Payer: COMMERCIAL

## 2023-01-06 VITALS
TEMPERATURE: 97.4 F | WEIGHT: 170.2 LBS | OXYGEN SATURATION: 99 % | DIASTOLIC BLOOD PRESSURE: 84 MMHG | RESPIRATION RATE: 12 BRPM | BODY MASS INDEX: 24.42 KG/M2 | SYSTOLIC BLOOD PRESSURE: 132 MMHG | HEART RATE: 92 BPM

## 2023-01-06 DIAGNOSIS — I21.4 NON-STEMI (NON-ST ELEVATED MYOCARDIAL INFARCTION) (HCC): Primary | ICD-10-CM

## 2023-01-06 DIAGNOSIS — R51.9 NONINTRACTABLE HEADACHE, UNSPECIFIED CHRONICITY PATTERN, UNSPECIFIED HEADACHE TYPE: ICD-10-CM

## 2023-01-06 DIAGNOSIS — G43.109 OPHTHALMIC MIGRAINE: ICD-10-CM

## 2023-01-06 PROCEDURE — 99214 OFFICE O/P EST MOD 30 MIN: CPT | Performed by: FAMILY MEDICINE

## 2023-01-06 RX ORDER — AMLODIPINE BESYLATE 2.5 MG/1
2.5 TABLET ORAL DAILY
Qty: 30 TABLET | Refills: 1 | Status: SHIPPED | OUTPATIENT
Start: 2023-01-06

## 2023-01-06 RX ORDER — ATORVASTATIN CALCIUM 20 MG/1
20 TABLET, FILM COATED ORAL DAILY
Qty: 30 TABLET | Refills: 5 | Status: SHIPPED | OUTPATIENT
Start: 2023-01-06

## 2023-01-06 RX ORDER — ASPIRIN 81 MG/1
81 TABLET ORAL DAILY
Qty: 30 TABLET | Refills: 5 | COMMUNITY
Start: 2023-01-06

## 2023-01-06 ASSESSMENT — PATIENT HEALTH QUESTIONNAIRE - PHQ9
SUM OF ALL RESPONSES TO PHQ9 QUESTIONS 1 & 2: 0
1. LITTLE INTEREST OR PLEASURE IN DOING THINGS: 0
SUM OF ALL RESPONSES TO PHQ QUESTIONS 1-9: 0
SUM OF ALL RESPONSES TO PHQ QUESTIONS 1-9: 0
2. FEELING DOWN, DEPRESSED OR HOPELESS: 0
SUM OF ALL RESPONSES TO PHQ QUESTIONS 1-9: 0
SUM OF ALL RESPONSES TO PHQ QUESTIONS 1-9: 0

## 2023-01-06 NOTE — PROGRESS NOTES
[facility]     Outpatient Cardiology         Patient Name:  Bhavna Kramer  Requesting Physician: Arielle Cornejo MD.  Primary Care Physician: Tye Romero MD    Reason for Consultation/Chief Complaint:   Chief Complaint   Patient presents with    New Patient         HPI:     45year old female with history of NSTEMI, s/p angiogram with normal coronaries presents for follow up of hospitalization with chest pain and elevated troponin. Denies further chest pain, sob, orthopnea, pnd. She previously noticed nocturnal chest pain. She was hospitalized at [facility] on 12/16/22, came in thru the ED with substernal chest discomfort and nausea, had elevated troponins (10, 173, 317, 441, 357, Ref Range <12 pg/mL). Cath, 12/16/22, No significant coronary artery disease. No LV dysfunction on aortogram. Ejection fraction 60 to 65% (Dr. Barry Levy)    Father had history of MI at 79, Bypass in late 76s  Mother had history of HTN. Histories:     Past Medical History:   has a past medical history of Allergic rhinitis, Allergies, Kidney stones, Kidney stones, and Visual impairment. Surgical History:   has a past surgical history that includes Appendectomy (1980) and intrauterine device insertion (2009). Social History:   reports that she has never smoked. She has never used smokeless tobacco. She reports current alcohol use. Family History:  No evidence for sudden cardiac death or premature CAD    Medications:     Home Medications:  Were reviewed and are listed in nursing record. and/or listed below    Prior to Admission medications    Medication Sig Start Date End Date Taking? Authorizing Provider   metoprolol succinate (TOPROL XL) 25 MG extended release tablet Take 0.5 tablets by mouth daily 1/9/23  Yes Gael Aggarwal MD   nitroGLYCERIN (NITROSTAT) 0.4 MG SL tablet up to max of 3 total doses.  If no relief after 1 dose, call 911. 1/9/23  Yes [place] Nelly Covarrubias MD   atorvastatin (LIPITOR) 20 MG tablet Take 1 tablet by mouth daily 1/6/23  Yes Luis Antonio Salcedo MD   aspirin EC 81 MG EC tablet Take 1 tablet by mouth daily 1/6/23  Yes Luis Antonio Salcedo MD   amLODIPine (NORVASC) 2.5 MG tablet Take 1 tablet by mouth daily 1/6/23  Yes Luis Antonio Salcedo MD   NONFORMULARY [facility]   Yes Historical Provider, MD   Multiple Vitamins-Minerals (THERAPEUTIC MULTIVITAMIN-MINERALS) tablet Take 1 tablet by mouth daily   Yes Historical Provider, MD   Fluticasone Propionate (FLONASE NA) by Nasal route   Yes Historical Provider, MD        Allergy:     Seasonal     Review of Systems:     Review of Systems   Constitutional:  Negative for chills and fever. Respiratory:          See HPI   Cardiovascular:         See HPI. Gastrointestinal:  Negative for abdominal pain and vomiting. Endocrine: Negative. Genitourinary: Negative. Skin: Negative. Psychiatric/Behavioral: Negative. All other systems reviewed and are negative. Physical Examination:     Vitals:    01/09/23 1257   BP: 128/78   Pulse: 69   Weight: 173 lb 3.2 oz (78.6 kg)   Height: 5' 10\" (1.778 m)       Wt Readings from Last 3 Encounters:   01/09/23 173 lb 3.2 oz (78.6 kg)   01/06/23 170 lb 3.2 oz (77.2 kg)   09/23/22 167 lb 3.2 oz (75.8 kg)       Physical Exam  Constitutional:       Appearance: Normal appearance. HENT:      Head: Normocephalic and atraumatic. Nose: Nose normal.   Eyes:      Conjunctiva/sclera: Conjunctivae normal.   Cardiovascular:      Rate and Rhythm: Normal rate and regular rhythm. Heart sounds: Normal heart sounds. Pulmonary:      Effort: Pulmonary effort is normal.      Breath sounds: Normal breath sounds. Abdominal:      Palpations: Abdomen is soft. Musculoskeletal:      Cervical back: Neck supple. Skin:     General: Skin is warm and dry. Neurological:      General: No focal deficit present. Mental Status: She is alert.          Labs:     Lab Results   Component Value Date WBC 4.1 10/12/2022    HGB 13.9 10/12/2022    HCT 41.5 10/12/2022    MCV 96.8 10/12/2022     10/12/2022     Lab Results   Component Value Date     10/12/2022    K 5.0 10/12/2022     10/12/2022    CO2 27 10/12/2022    BUN 16 10/12/2022    CREATININE 0.6 10/12/2022    GLUCOSE 96 10/12/2022    CALCIUM 9.4 10/12/2022    PROT 7.4 10/12/2022    LABALBU 4.7 10/12/2022    BILITOT 0.6 10/12/2022    ALKPHOS 76 10/12/2022    AST 20 10/12/2022    ALT 20 10/12/2022    LABGLOM >60 10/12/2022    GFRAA >60 10/12/2022    AGRATIO 1.7 10/12/2022    GLOB 2.2 11/01/2019         Lab Results   Component Value Date    CHOL 224 (H) 10/12/2022    CHOL 185 11/01/2019    CHOL 179 09/09/2017     Lab Results   Component Value Date    TRIG 76 10/12/2022    TRIG 58 11/01/2019    TRIG 57 09/09/2017     Lab Results   Component Value Date    HDL 78 (H) 10/12/2022    HDL 74 (H) 11/01/2019    HDL 84 (H) 09/09/2017     Lab Results   Component Value Date    LDLCALC 131 (H) 10/12/2022    LDLCALC 99 11/01/2019    LDLCALC 84 09/09/2017     Lab Results   Component Value Date    LABVLDL 15 10/12/2022    LABVLDL 12 11/01/2019    LABVLDL 11 09/09/2017     No results found for: CHOLHDLRATIO    No results found for: INR, PROTIME    The ASCVD Risk score ([place] DK, et al., 2019) failed to calculate for the following reasons:     The patient has a prior MI or stroke diagnosis      Imaging:       ECG (if available, Personally interpreted):    Last Monitor/Holter (if available):    Last Stress (if available):    Last Cath : 12/16/22  Impression:  No significant coronary artery disease  No LV dysfunction on aortogram  Ejection fraction 60 to 65%    Recommendations:  Consider microvascular angina work-up  If blood pressure medication required, consider calcium channel blockade as this may help with persistent anginal symptoms  Aggressive risk factor and lifestyle modifications, including daily physical activity, weight loss, low carbohydrate/lipid diet      Last TTE/SONAM(if available):    Last CMR  (if available):    Last Coronary Artery Calcium Score: Ankle-brachial index:    Carotid ultrasound screening:    Abdominal aortic aneurysm screening:    CXR: 12/16/22  IMPRESSION:   No acute pulmonary disease. Assessment / Plan:     1. Chest pain, unspecified type    2. Elevated troponin    Continue asa, statin daily  Start Toprol XL 12.5 mg Daily  Sublingual Nitroglycerin PRN  No previous echo? Obtain cardiac MRI to evaluate for scar vs myocarditis  RTC in 1 month  Orders Placed This Encounter   Procedures    MRI CARDIAC W WO CONTRAST    EKG 12 lead       Return in about 1 month (around 2/9/2023). The note was completed using EMR and Dragon dictation system. Every effort was made to ensure accuracy; however, inadvertent computerized transcription errors may be present. All questions and concerns were addressed to the patient. I would like to thank you for providing me the opportunity to participate in the care of your patient. If you have any questions, please do not hesitate to contact me. Kennedy Chris MD, [facility] [address] [address]  Main Office Phone: [phone]  Fax: [phone]    I, Steven Hernández RN, am scribing for and in the presence of Dr. Kennedy Chris. 01/09/23 3:06 PM  Steven Hernández RN    Physician Attestation:  The scribes documentation has been prepared under my direction and personally reviewed by me in its entirety. I confirm the note above accurately reflects all work, treatment, procedures, and medical decision making performed by me.     Electronically signed by Kennedy Chris MD on 1/9/2023 at 3:07 PM

## 2023-01-06 NOTE — PROGRESS NOTES
Patient is here for follow up of hospitalization . She was at work on Friday , [employer] . 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 She was carrying 3 pizzas but was on elevator. She would occasionally be in bed at night for a couple minutes in bed and would resolve. It would awaken her. She went to bathroom and lied down and then to breakroom. She started getting nauseated , diaphoresis, chest pain and shortness of breath and unusual feeling to hands. She took Excedrin , since she did not have Aspirin. Co-worker dropped her off at ED at [facility] and her  met her there. She had a normal tropinin level 10 first, 173, 300s . She went for cath Friday at 7 pm.  She was kept overnight. She was given Aspirin and Heparin     Denies any shortness of breath or chest pain now with exercise. No palpitations. She had some headache on 22 and 22 and some Kaleidoscope vision . She took Aspirin 4-81 mg that day . Her visual symptoms lasted about 10 minutes and headache resolved in 10-15 minutes. She could read fine. She was discharged with no medication changes , including no Aspirin or statin cholesterol medication. Family hx: Dad had MI in late 62s- 68 yo.  in  of lung problem. Her brothers have high cholesterol . Mother with hypertension. All 4 grandparents  of heart issues including heart attack. MGF had MI at 41 yo and  at 73 yo or so. Review of Systems    ROS: All other systems were reviewed and are negative . Patient's allergies and medications were reviewed. Patient's past medical, surgical, social , and family history were reviewed.     BP Readings from Last 3 Encounters:   23 132/84   22 130/88   21 110/78     OBJECTIVE:  /84   Pulse 92   Temp 97.4 °F (36.3 °C) (Temporal)   Resp 12   Wt 170 lb 3.2 oz (77.2 kg)   LMP  (LMP Unknown)   SpO2 99%   BMI 24.42 kg/m²     Physical Exam    General: NAD, cooperative, alert and oriented X 3. Mood / affect is good. good insight. well hydrated. Neck : no lymphadenopathy, supple, FROM  CV: Regular rate and rhythm , no murmurs/ rub/ gallop. No edema. Lungs : CTA bilaterally, breathing comfortably  Abdomen: positive bowel sounds, soft , non tender, non distended. No hepatosplenomegaly. No CVA tenderness. Skin: no rashes. Non tender. ASSESSMENT/  PLAN:  1. Non-STEMI (non-ST elevated myocardial infarction) [facility])  - Reviewed hospital records including cath report and labs. - Restart Aspirin 81 mg qd and Lipitor 20 mg qd. - atorvastatin (LIPITOR) 20 MG tablet; Take 1 tablet by mouth daily  Dispense: 30 tablet; Refill: 5  - aspirin EC 81 MG EC tablet; Take 1 tablet by mouth daily  Dispense: 30 tablet; Refill: 5  - Start AmLODIPine (NORVASC) 2.5 MG tablet; Take 1 tablet by mouth daily  Dispense: 30 tablet; Refill: 1  - Referral - Fidelia Rose MD, Cardiology, [facility]    2. Ophthalmic migraine  - Monitor . TIA is less likely but advised follow up if recurrence of symptoms , particularly with start of Aspirin 81 mg qd. 3. Nonintractable headache, unspecified chronicity pattern, unspecified headache type  - Likely migraine - ophthalmic . Monitor and follow up if recurs. Follow up 4 -5 weeks/ prn.

## 2023-01-09 ENCOUNTER — OFFICE VISIT (OUTPATIENT)
Dept: CARDIOLOGY CLINIC | Age: 53
End: 2023-01-09
Payer: COMMERCIAL

## 2023-01-09 VITALS
SYSTOLIC BLOOD PRESSURE: 128 MMHG | HEART RATE: 69 BPM | BODY MASS INDEX: 24.79 KG/M2 | WEIGHT: 173.2 LBS | DIASTOLIC BLOOD PRESSURE: 78 MMHG | HEIGHT: 70 IN

## 2023-01-09 DIAGNOSIS — R07.9 CHEST PAIN, UNSPECIFIED TYPE: Primary | ICD-10-CM

## 2023-01-09 DIAGNOSIS — R77.8 ELEVATED TROPONIN: ICD-10-CM

## 2023-01-09 PROCEDURE — 99204 OFFICE O/P NEW MOD 45 MIN: CPT | Performed by: INTERNAL MEDICINE

## 2023-01-09 PROCEDURE — 93000 ELECTROCARDIOGRAM COMPLETE: CPT | Performed by: INTERNAL MEDICINE

## 2023-01-09 RX ORDER — METOPROLOL SUCCINATE 25 MG/1
12.5 TABLET, EXTENDED RELEASE ORAL DAILY
Qty: 30 TABLET | Refills: 1 | Status: SHIPPED | OUTPATIENT
Start: 2023-01-09

## 2023-01-09 RX ORDER — NITROGLYCERIN 0.4 MG/1
TABLET SUBLINGUAL
Qty: 25 TABLET | Refills: 3 | Status: SHIPPED | OUTPATIENT
Start: 2023-01-09

## 2023-01-09 ASSESSMENT — ENCOUNTER SYMPTOMS
ABDOMINAL PAIN: 0
VOMITING: 0

## 2023-02-06 ENCOUNTER — HOSPITAL ENCOUNTER (OUTPATIENT)
Dept: MRI IMAGING | Age: 53
Discharge: HOME | End: 2023-02-06
Payer: COMMERCIAL

## 2023-02-06 DIAGNOSIS — R77.8 ELEVATED TROPONIN: ICD-10-CM

## 2023-02-06 DIAGNOSIS — R07.9 CHEST PAIN, UNSPECIFIED TYPE: ICD-10-CM

## 2023-02-06 LAB
LV EF: 54 %
LVEF MODALITY: NORMAL

## 2023-02-06 PROCEDURE — A9585 GADOBUTROL INJECTION: HCPCS | Performed by: INTERNAL MEDICINE

## 2023-02-06 PROCEDURE — 75561 CARDIAC MRI FOR MORPH W/DYE: CPT | Performed by: INTERNAL MEDICINE

## 2023-02-06 PROCEDURE — 75565 CARD MRI VELOC FLOW MAPPING: CPT | Performed by: INTERNAL MEDICINE

## 2023-02-06 PROCEDURE — 75559 CARDIAC MRI W/STRESS IMG: CPT

## 2023-02-06 PROCEDURE — 6360000004 HC RX CONTRAST MEDICATION: Performed by: INTERNAL MEDICINE

## 2023-02-06 RX ADMIN — GADOBUTROL 13 ML: 604.72 INJECTION INTRAVENOUS at 08:25

## 2023-02-08 ASSESSMENT — ENCOUNTER SYMPTOMS
ABDOMINAL PAIN: 0
VOMITING: 0

## 2023-02-08 NOTE — PROGRESS NOTES
[facility]     Outpatient Cardiology         Patient Name:  Tania Steve  Requesting Physician: Matthew Xiong MD.  Primary Care Physician: Winferd Hammans, MD    Reason for Consultation/Chief Complaint:   Chief Complaint   Patient presents with    Follow-up           HPI:     45year old female with history of NSTEMI presents for follow up    s/p angiogram (12/16/22) with normal coronaries     Denies chest pain, sob, orthopnea, pnd, edema, and palpitations. Cardiac MRI, 2/6/23, normal LVEF, Mild pericardial enhancement without edema suggestive of resolving pericarditis. Father had history of MI at 79, Bypass in late 76s  Mother had history of HTN. Histories:     Past Medical History:   has a past medical history of Allergic rhinitis, Allergies, Kidney stones, Kidney stones, and Visual impairment. Surgical History:   has a past surgical history that includes Appendectomy (1980) and intrauterine device insertion (2009). Social History:   reports that she has never smoked. She has never used smokeless tobacco. She reports current alcohol use. Family History:  No evidence for sudden cardiac death or premature CAD    Medications:     Home Medications:  Were reviewed and are listed in nursing record. and/or listed below    Prior to Admission medications    Medication Sig Start Date End Date Taking? Authorizing Provider   metoprolol succinate (TOPROL XL) 25 MG extended release tablet Take 0.5 tablets by mouth daily 1/9/23  Yes Kaylyn Hackett MD   nitroGLYCERIN (NITROSTAT) 0.4 MG SL tablet up to max of 3 total doses.  If no relief after 1 dose, call 911. 1/9/23  Yes Kaylyn Hackett MD   atorvastatin (LIPITOR) 20 MG tablet Take 1 tablet by mouth daily 1/6/23  Yes Winferd Hammans, MD   aspirin EC 81 MG EC tablet Take 1 tablet by mouth daily 1/6/23  Yes Winferd Hammans, MD   amLODIPine (NORVASC) 2.5 MG tablet Take 1 tablet by mouth daily 1/6/23  Yes Winferd Hammans, MD   NONFORMULARY KO   Yes Historical Provider, MD   Multiple Vitamins-Minerals (THERAPEUTIC MULTIVITAMIN-MINERALS) tablet Take 1 tablet by mouth daily   Yes Historical Provider, MD   Fluticasone Propionate (FLONASE NA) by Nasal route   Yes Historical Provider, MD        Allergy:     Seasonal     Review of Systems:     Review of Systems   Constitutional:  Negative for chills and fever. Respiratory:          See HPI   Cardiovascular:         See HPI. Gastrointestinal:  Negative for abdominal pain and vomiting. Endocrine: Negative. Genitourinary: Negative. Skin: Negative. Psychiatric/Behavioral: Negative. All other systems reviewed and are negative. Physical Examination:     Vitals:    02/09/23 0855   BP: 124/68   Pulse: 72   SpO2: 99%   Weight: 168 lb 3.2 oz (76.3 kg)   Height: 5' 10\" (1.778 m)         Wt Readings from Last 3 Encounters:   02/09/23 168 lb 3.2 oz (76.3 kg)   01/09/23 173 lb 3.2 oz (78.6 kg)   01/06/23 170 lb 3.2 oz (77.2 kg)       Physical Exam  Constitutional:       Appearance: Normal appearance. HENT:      Head: Normocephalic and atraumatic. Nose: Nose normal.   Eyes:      Conjunctiva/sclera: Conjunctivae normal.   Cardiovascular:      Rate and Rhythm: Normal rate and regular rhythm. Heart sounds: Normal heart sounds. Pulmonary:      Effort: Pulmonary effort is normal.      Breath sounds: Normal breath sounds. Abdominal:      Palpations: Abdomen is soft. Musculoskeletal:      Cervical back: Neck supple. Skin:     General: Skin is warm and dry. Neurological:      General: No focal deficit present. Mental Status: She is alert.          Labs:     Lab Results   Component Value Date    WBC 4.1 10/12/2022    HGB 13.9 10/12/2022    HCT 41.5 10/12/2022    MCV 96.8 10/12/2022     10/12/2022     Lab Results   Component Value Date     10/12/2022    K 5.0 10/12/2022     10/12/2022    CO2 27 10/12/2022    BUN 16 10/12/2022    CREATININE 0.6 10/12/2022 GLUCOSE 96 10/12/2022    CALCIUM 9.4 10/12/2022    PROT 7.4 10/12/2022    LABALBU 4.7 10/12/2022    BILITOT 0.6 10/12/2022    ALKPHOS 76 10/12/2022    AST 20 10/12/2022    ALT 20 10/12/2022    LABGLOM >60 10/12/2022    GFRAA >60 10/12/2022    AGRATIO 1.7 10/12/2022    GLOB 2.2 11/01/2019         Lab Results   Component Value Date    CHOL 224 (H) 10/12/2022    CHOL 185 11/01/2019    CHOL 179 09/09/2017     Lab Results   Component Value Date    TRIG 76 10/12/2022    TRIG 58 11/01/2019    TRIG 57 09/09/2017     Lab Results   Component Value Date    HDL 78 (H) 10/12/2022    HDL 74 (H) 11/01/2019    HDL 84 (H) 09/09/2017     Lab Results   Component Value Date    LDLCALC 131 (H) 10/12/2022    LDLCALC 99 11/01/2019    LDLCALC 84 09/09/2017     Lab Results   Component Value Date    LABVLDL 15 10/12/2022    LABVLDL 12 11/01/2019    LABVLDL 11 09/09/2017     No results found for: CHOLHDLRATIO    No results found for: INR, PROTIME    The ASCVD Risk score ([place] DK, et al., 2019) failed to calculate for the following reasons: The patient has a prior MI or stroke diagnosis      Imaging:       ECG (if available, Personally interpreted):    Last Monitor/Holter (if available):    Last Stress (if available):    Last Cath : 12/16/22  Impression:  No significant coronary artery disease  No LV dysfunction on aortogram  Ejection fraction 60 to 65%    Recommendations:  Consider microvascular angina work-up  If blood pressure medication required, consider calcium channel blockade as this may help with persistent anginal symptoms  Aggressive risk factor and lifestyle modifications, including daily physical activity, weight loss, low carbohydrate/lipid diet      Last TTE/SONAM(if available):    Last CMR : 2/6/23  CONCLUSIONS   Overall there is normal LV and RV systolic function. There is mild pericardial enhancement without edema suggestive of resolving pericarditis.  No obvious myocardium enhancement to suggest prior infarction or myocarditis.      -Normal left ventricular size and systolic function with a calculated ejection fraction of 54% by De Leon's method.  -Normal LV global longitudinal strain (GLS) -20%  -Normal right ventricular size and systolic function with a calculated ejection fraction of 63% by De Leon's method.  -No myocardial edema by T2w imaging/mapping. (Normal myocardium/skeletal ratio - normal < 1.9). -No significant intracardiac shunt. Calculated Qp:Qs:1.01 (Phase contrast velocity encoding Ao/Pa)  -Trivial pericardial effusion.  -Normal myocardial resting perfusion imaging.  -On delayed enhancement imaging, there is mild pericardial enhancement involving the anterior pericardium. Findings are suggestive of resolving pericarditis. The MRI sequences and imaging planes in this study were tailored for cardiac imaging and are suboptimal for evaluation of non-cardiac structures. Last Coronary Artery Calcium Score: Ankle-brachial index:    Carotid ultrasound screening:    Abdominal aortic aneurysm screening:    CXR: 12/16/22  IMPRESSION:   No acute pulmonary disease. Assessment / Plan:     1. Pericarditis, unspecified chronicity, unspecified type    2. Elevated troponin    3. Hyperlipidemia, unspecified hyperlipidemia type    MRI suggestive of resolving pericarditis, no infarct noted  Overall doing well, pressure at goal; continue amlodipine  Continue beta-blocker, aspirin and statin  RTC 6 months. Orders Placed This Encounter   Procedures    Comprehensive Metabolic Panel w/ Reflex to MG    Lipid Panel       Return in about 6 months (around 8/9/2023). The note was completed using EMR and Dragon dictation system. Every effort was made to ensure accuracy; however, inadvertent computerized transcription errors may be present. All questions and concerns were addressed to the patient. I would like to thank you for providing me the opportunity to participate in the care of your patient.  If you have any questions, please do not hesitate to contact me. Bakari Tolliver MD, [facility] [place]  The [address] [facility]  [address] [place] [address],[id number]  Main Office Phone: [phone]  Fax: [phone]    I, Selene Schwartz RN, am scribing for and in the presence of Dr. Bakari Tolliver. 02/10/23 11:18 AM  Selene Schwartz RN  Physician Attestation:  The scribes documentation has been prepared under my direction and personally reviewed by me in its entirety. I confirm the note above accurately reflects all work, treatment, procedures, and medical decision making performed by me.     Electronically signed by Bakari Tolliver MD on 2/10/2023 at 11:21 AM

## 2023-02-09 ENCOUNTER — OFFICE VISIT (OUTPATIENT)
Dept: CARDIOLOGY CLINIC | Age: 53
End: 2023-02-09
Payer: COMMERCIAL

## 2023-02-09 VITALS
BODY MASS INDEX: 24.08 KG/M2 | HEART RATE: 72 BPM | WEIGHT: 168.2 LBS | OXYGEN SATURATION: 99 % | HEIGHT: 70 IN | DIASTOLIC BLOOD PRESSURE: 68 MMHG | SYSTOLIC BLOOD PRESSURE: 124 MMHG

## 2023-02-09 DIAGNOSIS — E78.5 HYPERLIPIDEMIA, UNSPECIFIED HYPERLIPIDEMIA TYPE: ICD-10-CM

## 2023-02-09 DIAGNOSIS — I31.9 PERICARDITIS, UNSPECIFIED CHRONICITY, UNSPECIFIED TYPE: Primary | ICD-10-CM

## 2023-02-09 DIAGNOSIS — R77.8 ELEVATED TROPONIN: ICD-10-CM

## 2023-02-09 PROCEDURE — 99214 OFFICE O/P EST MOD 30 MIN: CPT | Performed by: INTERNAL MEDICINE

## 2023-02-24 DIAGNOSIS — I21.4 NON-STEMI (NON-ST ELEVATED MYOCARDIAL INFARCTION) (HCC): ICD-10-CM

## 2023-02-24 RX ORDER — ATORVASTATIN CALCIUM 20 MG/1
20 TABLET, FILM COATED ORAL DAILY
Qty: 90 TABLET | Refills: 1 | Status: SHIPPED | OUTPATIENT
Start: 2023-02-24

## 2023-03-01 DIAGNOSIS — I21.4 NON-STEMI (NON-ST ELEVATED MYOCARDIAL INFARCTION) (HCC): ICD-10-CM

## 2023-03-01 DIAGNOSIS — R07.9 CHEST PAIN, UNSPECIFIED TYPE: ICD-10-CM

## 2023-03-01 DIAGNOSIS — R77.8 ELEVATED TROPONIN: ICD-10-CM

## 2023-03-01 RX ORDER — AMLODIPINE BESYLATE 2.5 MG/1
2.5 TABLET ORAL DAILY
Qty: 90 TABLET | Refills: 1 | Status: SHIPPED | OUTPATIENT
Start: 2023-03-01

## 2023-03-01 RX ORDER — METOPROLOL SUCCINATE 25 MG/1
12.5 TABLET, EXTENDED RELEASE ORAL DAILY
Qty: 30 TABLET | Refills: 5 | Status: SHIPPED | OUTPATIENT
Start: 2023-03-01 | End: 2023-03-02 | Stop reason: SDUPTHER

## 2023-03-01 NOTE — TELEPHONE ENCOUNTER
Requested Prescriptions     Pending Prescriptions Disp Refills    amLODIPine (NORVASC) 2.5 MG tablet 30 tablet 1     Sig: Take 1 tablet by mouth daily     Last OV-1/6/2023  Labs- 2/10/23  NFOV

## 2023-03-02 ENCOUNTER — TELEPHONE (OUTPATIENT)
Dept: CARDIOLOGY CLINIC | Age: 53
End: 2023-03-02

## 2023-03-02 DIAGNOSIS — R77.8 ELEVATED TROPONIN: ICD-10-CM

## 2023-03-02 DIAGNOSIS — R07.9 CHEST PAIN, UNSPECIFIED TYPE: ICD-10-CM

## 2023-03-02 RX ORDER — METOPROLOL SUCCINATE 25 MG/1
12.5 TABLET, EXTENDED RELEASE ORAL DAILY
Qty: 90 TABLET | Refills: 1 | Status: SHIPPED | OUTPATIENT
Start: 2023-03-02

## 2023-03-02 NOTE — TELEPHONE ENCOUNTER
Toprol Xl 25 mg , 90 day count sent to [facility]'s pharmacy per patient request.

## 2023-05-11 DIAGNOSIS — R77.8 ELEVATED TROPONIN: ICD-10-CM

## 2023-05-11 DIAGNOSIS — I21.4 NON-STEMI (NON-ST ELEVATED MYOCARDIAL INFARCTION) (HCC): ICD-10-CM

## 2023-05-11 DIAGNOSIS — R07.9 CHEST PAIN, UNSPECIFIED TYPE: ICD-10-CM

## 2023-05-11 RX ORDER — AMLODIPINE BESYLATE 2.5 MG/1
2.5 TABLET ORAL DAILY
Qty: 90 TABLET | Refills: 1 | Status: SHIPPED | OUTPATIENT
Start: 2023-05-11

## 2023-05-11 NOTE — TELEPHONE ENCOUNTER
Requested Prescriptions     Pending Prescriptions Disp Refills    atorvastatin (LIPITOR) 20 MG tablet 90 tablet 3     Sig: Take 1 tablet by mouth daily    metoprolol succinate (TOPROL XL) 25 MG extended release tablet 45 tablet 3     Sig: Take 0.5 tablets by mouth daily            Last Office Visit: 2/9/2023     Next Office Visit: 8/16/2023         Last Labs: 02.10.2023

## 2023-05-12 RX ORDER — ATORVASTATIN CALCIUM 20 MG/1
20 TABLET, FILM COATED ORAL DAILY
Qty: 90 TABLET | Refills: 3 | Status: SHIPPED | OUTPATIENT
Start: 2023-05-12

## 2023-05-12 RX ORDER — METOPROLOL SUCCINATE 25 MG/1
12.5 TABLET, EXTENDED RELEASE ORAL DAILY
Qty: 45 TABLET | Refills: 3 | Status: SHIPPED | OUTPATIENT
Start: 2023-05-12

## 2023-08-09 NOTE — PROGRESS NOTES
[facility]     Outpatient Cardiology         Patient Name:  Trina Reza  Requesting Physician: Swapna Mg MD.  Primary Care Physician: PROVIDER UNKNOWN    Reason for Consultation/Chief Complaint:   Chief Complaint   Patient presents with    Follow-up           HPI:     Trina Reza is a 45 y.o. female with a history of NSTEMI. [facility] 12/2022 normal coronaries. Cardiac MRI, 2/6/23, normal LVEF, Mild pericardial enhancement without edema suggestive of resolving pericarditis. Today she reports that she has been feeling well from a cardiac standpoint. She is struggling with a herniated disc in her neck that has been limiting her activity levels as of late. Patient currently denies any weight gain, edema, palpitations, chest pain, shortness of breath, dizziness, and syncope. Father had history of MI at 79, Bypass in late 76s  Mother had history of HTN. Histories:     Past Medical History:   has a past medical history of Allergic rhinitis, Allergies, Kidney stones, Kidney stones, and Visual impairment. Surgical History:   has a past surgical history that includes Appendectomy (1980) and intrauterine device insertion (2009). Social History:   reports that she has never smoked. She has never used smokeless tobacco. She reports current alcohol use. Family History:  No evidence for sudden cardiac death or premature CAD    Medications:     Home Medications:  Were reviewed and are listed in nursing record. and/or listed below    Prior to Admission medications    Medication Sig Start Date End Date Taking?  Authorizing Provider   amLODIPine (NORVASC) 2.5 MG tablet Take 1 tablet by mouth daily 8/21/23  Yes Jcarlos Navarrete MD   atorvastatin (LIPITOR) 20 MG tablet Take 1 tablet by mouth daily 8/21/23  Yes Jcarlos Navarrete MD   metoprolol succinate (TOPROL XL) 25 MG extended release tablet Take 0.5 tablets by mouth daily 8/21/23  Yes Jcarlos Navarrete MD   nitroGLYCERIN (NITROSTAT)

## 2023-08-21 ENCOUNTER — OFFICE VISIT (OUTPATIENT)
Dept: CARDIOLOGY CLINIC | Age: 53
End: 2023-08-21
Payer: COMMERCIAL

## 2023-08-21 VITALS
HEIGHT: 70 IN | OXYGEN SATURATION: 98 % | DIASTOLIC BLOOD PRESSURE: 78 MMHG | HEART RATE: 91 BPM | WEIGHT: 169.8 LBS | BODY MASS INDEX: 24.31 KG/M2 | SYSTOLIC BLOOD PRESSURE: 122 MMHG

## 2023-08-21 DIAGNOSIS — E78.5 HYPERLIPIDEMIA, UNSPECIFIED HYPERLIPIDEMIA TYPE: ICD-10-CM

## 2023-08-21 DIAGNOSIS — I10 HYPERTENSION, UNSPECIFIED TYPE: ICD-10-CM

## 2023-08-21 DIAGNOSIS — I31.9 PERICARDITIS, UNSPECIFIED CHRONICITY, UNSPECIFIED TYPE: Primary | ICD-10-CM

## 2023-08-21 PROCEDURE — 3078F DIAST BP <80 MM HG: CPT | Performed by: INTERNAL MEDICINE

## 2023-08-21 PROCEDURE — G8420 CALC BMI NORM PARAMETERS: HCPCS | Performed by: INTERNAL MEDICINE

## 2023-08-21 PROCEDURE — 99214 OFFICE O/P EST MOD 30 MIN: CPT | Performed by: INTERNAL MEDICINE

## 2023-08-21 PROCEDURE — 1036F TOBACCO NON-USER: CPT | Performed by: INTERNAL MEDICINE

## 2023-08-21 PROCEDURE — 3074F SYST BP LT 130 MM HG: CPT | Performed by: INTERNAL MEDICINE

## 2023-08-21 PROCEDURE — G8427 DOCREV CUR MEDS BY ELIG CLIN: HCPCS | Performed by: INTERNAL MEDICINE

## 2023-08-21 PROCEDURE — 3017F COLORECTAL CA SCREEN DOC REV: CPT | Performed by: INTERNAL MEDICINE

## 2023-08-21 RX ORDER — METOPROLOL SUCCINATE 25 MG/1
12.5 TABLET, EXTENDED RELEASE ORAL DAILY
Qty: 45 TABLET | Refills: 3 | Status: SHIPPED | OUTPATIENT
Start: 2023-08-21

## 2023-08-21 RX ORDER — ATORVASTATIN CALCIUM 20 MG/1
20 TABLET, FILM COATED ORAL DAILY
Qty: 90 TABLET | Refills: 3 | Status: SHIPPED | OUTPATIENT
Start: 2023-08-21

## 2023-08-21 RX ORDER — AMLODIPINE BESYLATE 2.5 MG/1
2.5 TABLET ORAL DAILY
Qty: 90 TABLET | Refills: 3 | Status: SHIPPED | OUTPATIENT
Start: 2023-08-21

## 2023-08-21 NOTE — PATIENT INSTRUCTIONS
Bp at goal, continue Amlodipine and Metoprolol   Lipids at goal, continue Lipitor.    Repeat echocardiogram February 2024  Follow up with me in 6 months

## 2023-09-25 ENCOUNTER — OFFICE VISIT (OUTPATIENT)
Dept: FAMILY MEDICINE CLINIC | Age: 53
End: 2023-09-25
Payer: COMMERCIAL

## 2023-09-25 VITALS
OXYGEN SATURATION: 98 % | WEIGHT: 173 LBS | TEMPERATURE: 97.3 F | BODY MASS INDEX: 24.77 KG/M2 | DIASTOLIC BLOOD PRESSURE: 84 MMHG | SYSTOLIC BLOOD PRESSURE: 124 MMHG | HEART RATE: 75 BPM | HEIGHT: 70 IN

## 2023-09-25 DIAGNOSIS — M54.12 CERVICAL RADICULOPATHY: Primary | ICD-10-CM

## 2023-09-25 DIAGNOSIS — Z87.898 HISTORY OF MOTION SICKNESS: ICD-10-CM

## 2023-09-25 DIAGNOSIS — G47.30 SLEEP APNEA, UNSPECIFIED TYPE: ICD-10-CM

## 2023-09-25 DIAGNOSIS — Z78.0 POST-MENOPAUSAL: ICD-10-CM

## 2023-09-25 PROCEDURE — 99214 OFFICE O/P EST MOD 30 MIN: CPT | Performed by: FAMILY MEDICINE

## 2023-09-25 PROCEDURE — G8420 CALC BMI NORM PARAMETERS: HCPCS | Performed by: FAMILY MEDICINE

## 2023-09-25 PROCEDURE — 1036F TOBACCO NON-USER: CPT | Performed by: FAMILY MEDICINE

## 2023-09-25 PROCEDURE — 3017F COLORECTAL CA SCREEN DOC REV: CPT | Performed by: FAMILY MEDICINE

## 2023-09-25 PROCEDURE — G8427 DOCREV CUR MEDS BY ELIG CLIN: HCPCS | Performed by: FAMILY MEDICINE

## 2023-09-25 RX ORDER — METHYLPREDNISOLONE 4 MG/1
TABLET ORAL
Qty: 1 KIT | Refills: 0 | Status: SHIPPED | OUTPATIENT
Start: 2023-09-25 | End: 2023-09-26 | Stop reason: SDUPTHER

## 2023-09-25 RX ORDER — ONDANSETRON 4 MG/1
4 TABLET, ORALLY DISINTEGRATING ORAL 3 TIMES DAILY PRN
Qty: 21 TABLET | Refills: 0 | Status: SHIPPED | OUTPATIENT
Start: 2023-09-25 | End: 2023-09-26 | Stop reason: SDUPTHER

## 2023-09-25 SDOH — HEALTH STABILITY: PHYSICAL HEALTH: ON AVERAGE, HOW MANY MINUTES DO YOU ENGAGE IN EXERCISE AT THIS LEVEL?: 20 MIN

## 2023-09-25 SDOH — ECONOMIC STABILITY: INCOME INSECURITY: HOW HARD IS IT FOR YOU TO PAY FOR THE VERY BASICS LIKE FOOD, HOUSING, MEDICAL CARE, AND HEATING?: NOT HARD AT ALL

## 2023-09-25 SDOH — ECONOMIC STABILITY: FOOD INSECURITY: WITHIN THE PAST 12 MONTHS, YOU WORRIED THAT YOUR FOOD WOULD RUN OUT BEFORE YOU GOT MONEY TO BUY MORE.: NEVER TRUE

## 2023-09-25 SDOH — ECONOMIC STABILITY: HOUSING INSECURITY
IN THE LAST 12 MONTHS, WAS THERE A TIME WHEN YOU DID NOT HAVE A STEADY PLACE TO SLEEP OR SLEPT IN A SHELTER (INCLUDING NOW)?: NO

## 2023-09-25 SDOH — ECONOMIC STABILITY: FOOD INSECURITY: WITHIN THE PAST 12 MONTHS, THE FOOD YOU BOUGHT JUST DIDN'T LAST AND YOU DIDN'T HAVE MONEY TO GET MORE.: NEVER TRUE

## 2023-09-25 SDOH — HEALTH STABILITY: PHYSICAL HEALTH: ON AVERAGE, HOW MANY DAYS PER WEEK DO YOU ENGAGE IN MODERATE TO STRENUOUS EXERCISE (LIKE A BRISK WALK)?: 5 DAYS

## 2023-09-25 NOTE — PROGRESS NOTES
Portions of this chart may have been created with voice recognition software. Occasional wrong-word or \"sound-alike\" substitutions may have occurred due to the inherent limitations of voice recognition software. Read the chart carefully and recognize, using context, where these substitutions have occurred      Chief Complaint     New Patient (Est.), Snoring (Frequent snoring), Travel Consult (Traveling internationally in October, would like to discuss getting just in case medications), and Menopause (Discuss hormone replacement therapy)       SUBJECTIVE    Nico Turcios is a 45 y.o. female here for establishing care with me as well as following concerns    1. Cervical radiculopathy    Recent flare of neck pain,now undergoing PT and improving. Travelling the country for vacation in the coming months and would like to have a refill of the Medrol Dosepak with her for any severe flareup of pain. 2. Post-menopausal  Patient has been experiencing postmenopausal syndrome and I had like to get more information on the hormone replacement therapy. She would like to consider starting on that. - Sravan Davalos MD, Endocrinology, [facility]    3. Sleep apnea, unspecified type  Patient states that that she has been excessively snoring and her  has noticed witnessed apnea during her sleep and would like further evaluation and management at this time. No excessive fatigue or excessive daytime sleepiness. - Jose Beck MD, Sleep Medicine [facility]    4. History of motion sickness  Patient would like to have some nausea medications if needed for motion sickness as well. REVIEW OF SYSTEMS:  CONSTITUTIONAL: No weight loss, fever, weakness or fatigue. HEENT:No sore throat, runny nose, earache. No vision or hearing disturbances   CARDIOVASCULAR: No chest pain,No palpitations or edema. RESPIRATORY : No shortness of breath, cough.   GASTROINTESTINAL: No nausea, vomiting, diarrhea

## 2023-09-26 ENCOUNTER — TELEPHONE (OUTPATIENT)
Dept: FAMILY MEDICINE CLINIC | Age: 53
End: 2023-09-26

## 2023-09-26 RX ORDER — METHYLPREDNISOLONE 4 MG/1
TABLET ORAL
Qty: 1 KIT | Refills: 0 | Status: SHIPPED | OUTPATIENT
Start: 2023-09-26 | End: 2023-10-02

## 2023-09-26 RX ORDER — ONDANSETRON 4 MG/1
4 TABLET, ORALLY DISINTEGRATING ORAL 3 TIMES DAILY PRN
Qty: 21 TABLET | Refills: 0 | Status: SHIPPED | OUTPATIENT
Start: 2023-09-26

## 2023-09-26 NOTE — TELEPHONE ENCOUNTER
Pt would like the medications that she got prescribed at her appointment yesterday sent to this [facility] instead of the mail in pharmacy   [facility], [address] [place] [address]

## 2024-01-03 PROBLEM — I10 HYPERTENSION, ESSENTIAL: Status: ACTIVE | Noted: 2024-01-03

## 2024-01-03 PROBLEM — I10 HYPERTENSION, ESSENTIAL: Chronic | Status: ACTIVE | Noted: 2024-01-03

## 2024-01-05 ENCOUNTER — TELEPHONE (OUTPATIENT)
Dept: SLEEP CENTER | Age: 54
End: 2024-01-05

## 2024-01-05 NOTE — TELEPHONE ENCOUNTER
Called to schedule an hst per Moreno chandler for the pt to return my call     [facility] insurance

## 2024-01-12 ENCOUNTER — HOSPITAL ENCOUNTER (OUTPATIENT)
Dept: SLEEP CENTER | Age: 54
Discharge: HOME | End: 2024-01-12
Payer: COMMERCIAL

## 2024-01-12 DIAGNOSIS — G47.10 HYPERSOMNIA: ICD-10-CM

## 2024-01-12 DIAGNOSIS — R06.83 SNORING: ICD-10-CM

## 2024-01-12 PROCEDURE — 95806 SLEEP STUDY UNATT&RESP EFFT: CPT

## 2024-01-18 ENCOUNTER — TELEPHONE (OUTPATIENT)
Dept: PULMONOLOGY | Age: 54
End: 2024-01-18

## 2024-01-18 NOTE — TELEPHONE ENCOUNTER
Pt called returning Consuelo's call to review SS results. Informed pt that when someone is available, we will reach back out to go over results.     Ph. [phone]

## 2024-01-24 ENCOUNTER — TELEPHONE (OUTPATIENT)
Dept: PULMONOLOGY | Age: 54
End: 2024-01-24

## 2024-01-24 NOTE — PROGRESS NOTES
Ellie Ellington         : 1970    Diagnosis: [x] ISAIAH (G47.33) [] CSA (G47.31) [] Apnea (G47.30)   Length of Need: [x] 18 Months [] 99 Months [] Other:        Mask:   [x] Nasal () /1 per 3 months [] Full Face () /1 per 3 months   [x] Patient choice -Size and fit mask [] Patient Choice - Size and fit mask   [] Dispense:  [] Dispense:    [x] Headgear () / 1 per 3 months [] Headgear () / 1 per 3 months   [] Replacement Nasal Cushion ()/2 per month [] Interface Replacement ()/1 per month   [x] Replacement Nasal Pillows ()/2 per month           Start Order Date: 24    MEDICAL JUSTIFICATION:  I, the undersigned, certify that the above prescribed supplies are medically necessary for this patient’s wellbeing.  In my opinion, the supplies are both reasonable and necessary in reference to accepted standards of medicalpractice in treatment of this patient’s condition.    DALTON WILKINSON MD      NPI: [id number]       Order Signed Date: 24    Electronically signed by DALTON WILKINSON MD on 2024 at 10:57 AM    Ellie Ellington  1970  [address] [place] [address]  [phone] (home) [phone] (work)  [phone] (mobile)      Insurance Info (confirm with patient if correct):  Payer/Plan Subscr  Sex Relation Sub. Ins. ID Effective Group Num   1. [facility]* ELLIE ELLINGTON* 1970 Female Self [id number]                                   [address] [address]   2. [employer] - [employer] -* ELLIE ELLINGTON* 1970 Female Self [id number]                                   [id number]

## 2024-01-24 NOTE — TELEPHONE ENCOUNTER
Spoke with pt to reviewe HST results.  Order to be sent to [facility].  Pt to schedule f/u.

## 2024-01-24 NOTE — PROGRESS NOTES
Lori Ellington         : 1970  [facility]    Diagnosis: [x] ISAIAH (G47.33) [] CSA (G47.31) [] Apnea (G47.30)   Length of Need: [x] 18 Months [] 99 Months [] Other:    Machine (JAIMIE!): [] Respironics Dream Station   2   Auto [x] ResMed AirSense     Auto S11 or S10 (if bilevel) [] Other:     [x]  CPAP () [] Bilevel ()   Mode: [x] Auto [] Spontaneous    Mode: [] Auto [] Spontaneous      P min 6 cmH2O  P max 16 cmH2O      Comfort Settings:   - Ramp Pressure: 5 cmH2O                                        - Ramp time: 15 min                                     -  Flex/EPR - 3 full time                                    - For ResMed Bilevel (TiMax-4 sec   TiMin- 0.2 sec)     Humidifier: [x] Heated ()        [x] Water chamber replacement ()/ 1 per 6 months        Mask:   [x] Nasal () /1 per 3 months [] Full Face () /1 per 3 months   [x] Patient choice -Size and fit mask [] Patient Choice - Size and fit mask   [] Dispense:  [] Dispense:    [x] Headgear () / 1 per 3 months [] Headgear () / 1 per 3 months   [x] Replacement Nasal Cushion ()/2 per month [] Interface Replacement ()/1 per month   [] Replacement Nasal Pillows ()/2 per month         Tubing: [x] Heated ()/1 per 3 months    [] Standard ()/1 per 3 months [] Other:           Filters: [x] Non-disposable ()/1 per 6 months     [x] Ultra-Fine, Disposable ()/2 per month        Miscellaneous: [] Chin Strap ()/ 1 per 6 months [] O2 bleed-in:       LPM   [] Oximetry on CPAP/Bilevel []  Other:    [x] Modem: ()         Start Order Date: 24    MEDICAL JUSTIFICATION:  I, the undersigned, certify that the above prescribed supplies are medically necessary for this patient’s wellbeing.  In my opinion, the supplies are both reasonable and necessary in reference to accepted standards of medicalpractice in treatment of this patient’s condition.    DALTON WILKINSON MD      NPI:

## 2024-01-24 NOTE — PROGRESS NOTES
Ellie Ellington         : 1970    Diagnosis: [x] ISAIAH (G47.33) [] CSA (G47.31) [] Apnea (G47.30)   Length of Need: [x] 18 Months [] 99 Months [] Other:        Mask:   [] Nasal () /1 per 3 months [x] Full Face () /1 per 3 months   [] Patient choice -Size and fit mask [x] Patient Choice - Size and fit mask   [] Dispense:  [] Dispense:    [] Headgear () / 1 per 3 months [x] Headgear () / 1 per 3 months   [] Replacement Nasal Cushion ()/2 per month [x] Interface Replacement ()/1 per month   [] Replacement Nasal Pillows ()/2 per month           Start Order Date: 24    MEDICAL JUSTIFICATION:  I, the undersigned, certify that the above prescribed supplies are medically necessary for this patient’s wellbeing.  In my opinion, the supplies are both reasonable and necessary in reference to accepted standards of medicalpractice in treatment of this patient’s condition.    DALTON WILKINSON MD      NPI: [id number]       Order Signed Date: 24    Electronically signed by DALTON WILKINSON MD on 2024 at 10:57 AM    Ellie Ellington  1970  [address] [place] [facility] [address]  [phone] (home) [phone] (work)  [phone] (mobile)      Insurance Info (confirm with patient if correct):  Payer/Plan Subscr  Sex Relation Sub. Ins. ID Effective Group Num   1. [facility]* ELLIE ELLINGTON* 1970 Female Self [id number]                                   [address] [address]   2. [employer] - [employer] -* ELLIE ELLINGTON* 1970 Female Self [id number]                                   [address]

## 2024-02-06 ENCOUNTER — HOSPITAL ENCOUNTER (OUTPATIENT)
Dept: NON INVASIVE DIAGNOSTICS | Age: 54
Discharge: HOME | End: 2024-02-06
Payer: COMMERCIAL

## 2024-02-06 DIAGNOSIS — I31.9 PERICARDITIS, UNSPECIFIED CHRONICITY, UNSPECIFIED TYPE: ICD-10-CM

## 2024-02-06 PROCEDURE — 93306 TTE W/DOPPLER COMPLETE: CPT

## 2024-03-07 NOTE — PROGRESS NOTES
diet      Last TTE/SONAM    Echo 2/6/24  Summary   Normal left ventricle size, wall thickness, and systolic function with an   estimated ejection fraction of 55-60%. No regional wall motion abnormalities   are seen. Normal diastolic function. There is mild tricuspid valve   regurgitation    Last CMR : 2/6/23  CONCLUSIONS   Overall there is normal LV and RV systolic function.  There is mild pericardial enhancement without edema suggestive of resolving pericarditis. No obvious myocardium enhancement to suggest prior infarction or myocarditis.      -Normal left ventricular size and systolic function with a calculated ejection fraction of 54% by De Leon's method.  -Normal LV global longitudinal strain (GLS) -20%  -Normal right ventricular size and systolic function with a calculated ejection fraction of 63% by De Leon's method.  -No myocardial edema by T2w imaging/mapping. (Normal myocardium/skeletal ratio - normal < 1.9).  -No significant intracardiac shunt. Calculated Qp:Qs:1.01 (Phase contrast velocity encoding Ao/Pa)  -Trivial pericardial effusion.  -Normal myocardial resting perfusion imaging.  -On delayed enhancement imaging, there is mild pericardial enhancement involving the anterior pericardium. Findings are suggestive of resolving pericarditis.      The MRI sequences and imaging planes in this study were tailored for cardiac imaging and are suboptimal for evaluation of non-cardiac structures.    Last Coronary Artery Calcium Score:     Ankle-brachial index:    Carotid ultrasound screening:    Abdominal aortic aneurysm screening:    CXR: 12/16/22  IMPRESSION:   No acute pulmonary disease.    Assessment / Plan:     1. History of pericarditis    2. Hypertension, essential      Bp at goal, continue Metoprolol   Monitor BP at home, if consistently >140/90, call and will likely restart Amlodipine  Labs today: CMP, lipids, CBC  Follow up with me in 1 year     Orders Placed This Encounter   Procedures    Comprehensive

## 2024-03-08 ENCOUNTER — OFFICE VISIT (OUTPATIENT)
Dept: CARDIOLOGY CLINIC | Age: 54
End: 2024-03-08

## 2024-03-08 VITALS
OXYGEN SATURATION: 98 % | BODY MASS INDEX: 24.45 KG/M2 | DIASTOLIC BLOOD PRESSURE: 78 MMHG | HEART RATE: 72 BPM | SYSTOLIC BLOOD PRESSURE: 126 MMHG | WEIGHT: 170.4 LBS

## 2024-03-08 DIAGNOSIS — Z86.79 HISTORY OF PERICARDITIS: Primary | ICD-10-CM

## 2024-03-08 DIAGNOSIS — I10 HYPERTENSION, ESSENTIAL: Chronic | ICD-10-CM

## 2024-03-08 RX ORDER — BLOOD PRESSURE TEST KIT
1 KIT MISCELLANEOUS ONCE
Qty: 1 KIT | Refills: 0 | Status: SHIPPED | OUTPATIENT
Start: 2024-03-08 | End: 2024-03-08

## 2024-03-08 NOTE — PATIENT INSTRUCTIONS
Bp at goal, continue Metoprolol   Monitor BP at home, if consistently >140/90, call and will likely restart Amlodipine  Labs today: CMP, lipids, CBC  Follow up with me in 1 year

## 2024-03-11 DIAGNOSIS — I10 HYPERTENSION, ESSENTIAL: Chronic | ICD-10-CM

## 2024-03-11 DIAGNOSIS — Z86.79 HISTORY OF PERICARDITIS: ICD-10-CM

## 2024-03-11 LAB
ALBUMIN SERPL-MCNC: 4.1 G/DL (ref 3.4–5)
ALBUMIN/GLOB SERPL: 1.4 {RATIO} (ref 1.1–2.2)
ALP SERPL-CCNC: 80 U/L (ref 40–129)
ALT SERPL-CCNC: 20 U/L (ref 10–40)
ANION GAP SERPL CALCULATED.3IONS-SCNC: 10 MMOL/L (ref 3–16)
AST SERPL-CCNC: 19 U/L (ref 15–37)
BASOPHILS # BLD: 0.1 K/UL (ref 0–0.2)
BASOPHILS NFR BLD: 1.4 %
BILIRUB SERPL-MCNC: <0.2 MG/DL (ref 0–1)
BUN SERPL-MCNC: 20 MG/DL (ref 7–20)
CALCIUM SERPL-MCNC: 9.2 MG/DL (ref 8.3–10.6)
CHLORIDE SERPL-SCNC: 106 MMOL/L (ref 99–110)
CHOLEST SERPL-MCNC: 129 MG/DL (ref 0–199)
CO2 SERPL-SCNC: 24 MMOL/L (ref 21–32)
CREAT SERPL-MCNC: 0.6 MG/DL (ref 0.6–1.1)
DEPRECATED RDW RBC AUTO: 13.2 % (ref 12.4–15.4)
EOSINOPHIL # BLD: 0.1 K/UL (ref 0–0.6)
EOSINOPHIL NFR BLD: 1.8 %
GFR SERPLBLD CREATININE-BSD FMLA CKD-EPI: >60 ML/MIN/{1.73_M2}
GLUCOSE SERPL-MCNC: 95 MG/DL (ref 70–99)
HCT VFR BLD AUTO: 38.6 % (ref 36–48)
HDLC SERPL-MCNC: 55 MG/DL (ref 40–60)
HGB BLD-MCNC: 13.1 G/DL (ref 12–16)
LDLC SERPL CALC-MCNC: 55 MG/DL
LYMPHOCYTES # BLD: 1.8 K/UL (ref 1–5.1)
LYMPHOCYTES NFR BLD: 35.6 %
MCH RBC QN AUTO: 32.7 PG (ref 26–34)
MCHC RBC AUTO-ENTMCNC: 33.9 G/DL (ref 31–36)
MCV RBC AUTO: 96.5 FL (ref 80–100)
MONOCYTES # BLD: 0.5 K/UL (ref 0–1.3)
MONOCYTES NFR BLD: 10.3 %
NEUTROPHILS # BLD: 2.6 K/UL (ref 1.7–7.7)
NEUTROPHILS NFR BLD: 50.9 %
PLATELET # BLD AUTO: 329 K/UL (ref 135–450)
PMV BLD AUTO: 8.2 FL (ref 5–10.5)
POTASSIUM SERPL-SCNC: 4.6 MMOL/L (ref 3.5–5.1)
PROT SERPL-MCNC: 7 G/DL (ref 6.4–8.2)
RBC # BLD AUTO: 4 M/UL (ref 4–5.2)
SODIUM SERPL-SCNC: 140 MMOL/L (ref 136–145)
TRIGL SERPL-MCNC: 96 MG/DL (ref 0–150)
VLDLC SERPL CALC-MCNC: 19 MG/DL
WBC # BLD AUTO: 5.1 K/UL (ref 4–11)